# Patient Record
Sex: MALE | Race: WHITE | Employment: OTHER | URBAN - METROPOLITAN AREA
[De-identification: names, ages, dates, MRNs, and addresses within clinical notes are randomized per-mention and may not be internally consistent; named-entity substitution may affect disease eponyms.]

---

## 2021-10-04 ENCOUNTER — OFFICE VISIT (OUTPATIENT)
Dept: INTERNAL MEDICINE CLINIC | Facility: CLINIC | Age: 81
End: 2021-10-04
Payer: MEDICARE

## 2021-10-04 VITALS
OXYGEN SATURATION: 98 % | HEIGHT: 71 IN | BODY MASS INDEX: 23.49 KG/M2 | HEART RATE: 82 BPM | TEMPERATURE: 98 F | SYSTOLIC BLOOD PRESSURE: 138 MMHG | WEIGHT: 167.81 LBS | DIASTOLIC BLOOD PRESSURE: 80 MMHG

## 2021-10-04 DIAGNOSIS — R25.2 LEG CRAMPS: ICD-10-CM

## 2021-10-04 DIAGNOSIS — R26.89 BALANCE DISORDER: Primary | ICD-10-CM

## 2021-10-04 DIAGNOSIS — I73.9 PERIPHERAL VASCULAR DISEASE (HCC): ICD-10-CM

## 2021-10-04 DIAGNOSIS — E78.5 HYPERLIPIDEMIA, UNSPECIFIED HYPERLIPIDEMIA TYPE: ICD-10-CM

## 2021-10-04 DIAGNOSIS — R10.13 DYSPEPSIA: ICD-10-CM

## 2021-10-04 DIAGNOSIS — Z23 NEED FOR VACCINATION: ICD-10-CM

## 2021-10-04 DIAGNOSIS — I71.4 ABDOMINAL AORTIC ANEURYSM (AAA) WITHOUT RUPTURE (HCC): ICD-10-CM

## 2021-10-04 PROBLEM — R33.8 BENIGN PROSTATIC HYPERPLASIA WITH URINARY RETENTION: Status: ACTIVE | Noted: 2021-10-04

## 2021-10-04 PROBLEM — N40.1 BENIGN PROSTATIC HYPERPLASIA WITH URINARY RETENTION: Status: RESOLVED | Noted: 2021-10-04 | Resolved: 2021-10-04

## 2021-10-04 PROBLEM — N40.1 BENIGN PROSTATIC HYPERPLASIA WITH URINARY RETENTION: Status: ACTIVE | Noted: 2021-10-04

## 2021-10-04 PROBLEM — R33.8 BENIGN PROSTATIC HYPERPLASIA WITH URINARY RETENTION: Status: RESOLVED | Noted: 2021-10-04 | Resolved: 2021-10-04

## 2021-10-04 PROCEDURE — 99204 OFFICE O/P NEW MOD 45 MIN: CPT | Performed by: INTERNAL MEDICINE

## 2021-10-04 PROCEDURE — 90732 PPSV23 VACC 2 YRS+ SUBQ/IM: CPT | Performed by: INTERNAL MEDICINE

## 2021-10-04 PROCEDURE — G0009 ADMIN PNEUMOCOCCAL VACCINE: HCPCS | Performed by: INTERNAL MEDICINE

## 2021-10-04 RX ORDER — PANTOPRAZOLE SODIUM 40 MG/1
40 TABLET, DELAYED RELEASE ORAL DAILY
COMMUNITY
Start: 2021-08-30

## 2021-10-04 RX ORDER — CLOPIDOGREL BISULFATE 75 MG/1
75 TABLET ORAL DAILY
COMMUNITY
Start: 2021-08-05

## 2021-10-04 RX ORDER — SIMVASTATIN 40 MG
40 TABLET ORAL NIGHTLY
COMMUNITY
Start: 2021-05-25 | End: 2021-10-04

## 2021-10-04 RX ORDER — SIMVASTATIN 40 MG
40 TABLET ORAL NIGHTLY
Qty: 90 TABLET | Refills: 3 | Status: SHIPPED | OUTPATIENT
Start: 2021-10-04

## 2021-10-04 NOTE — PROGRESS NOTES
Patient was given Pneumococcal vaccine (pneumovax 23). Left deltoid was used for the injection at a 90 degree angle (intramuscular). Patient tolerated vaccine well.

## 2021-10-04 NOTE — PROGRESS NOTES
Cristina Cao  68year old male  Patient presents with:  Establish Care: new to our practice  Other: off balance  . HPI:       This is first visit with patient. He is a 68years old he is retired.   He lives with his wife and daughter who has some • Hernia surgery        History reviewed. No pertinent family history.   Social History    Tobacco Use      Smoking status: Current Every Day Smoker      Smokeless tobacco: Never Used      Tobacco comment: 4-6     Alcohol use: Yes      Comment: 1 of day intensity statin but he thinks he might of had an issue with one in the past.  He currently is on clopidogrel only he is not on dual antiplatelet agents I think that is consistent with the literature.     I would like to review his chart in more detail and

## 2021-11-01 ENCOUNTER — OFFICE VISIT (OUTPATIENT)
Dept: INTERNAL MEDICINE CLINIC | Facility: CLINIC | Age: 81
End: 2021-11-01
Payer: MEDICARE

## 2021-11-01 VITALS
HEART RATE: 63 BPM | SYSTOLIC BLOOD PRESSURE: 122 MMHG | WEIGHT: 169.19 LBS | DIASTOLIC BLOOD PRESSURE: 88 MMHG | TEMPERATURE: 97 F | OXYGEN SATURATION: 100 % | BODY MASS INDEX: 24 KG/M2

## 2021-11-01 DIAGNOSIS — I73.9 PERIPHERAL VASCULAR DISEASE (HCC): ICD-10-CM

## 2021-11-01 DIAGNOSIS — K56.609 SMALL BOWEL OBSTRUCTION (HCC): Primary | ICD-10-CM

## 2021-11-01 DIAGNOSIS — R00.1 BRADYCARDIA: ICD-10-CM

## 2021-11-01 DIAGNOSIS — I71.4 ABDOMINAL AORTIC ANEURYSM (AAA) WITHOUT RUPTURE (HCC): ICD-10-CM

## 2021-11-01 DIAGNOSIS — R26.89 BALANCE DISORDER: ICD-10-CM

## 2021-11-01 PROCEDURE — 99214 OFFICE O/P EST MOD 30 MIN: CPT | Performed by: INTERNAL MEDICINE

## 2021-11-01 RX ORDER — AMOXICILLIN 250 MG
2 CAPSULE ORAL NIGHTLY
COMMUNITY
Start: 2021-10-16 | End: 2021-11-15

## 2021-11-01 NOTE — PROGRESS NOTES
Jermaine Espinoza  68year old male  Patient presents with: Follow - Up: balance disorder  ER F/U: Small bowel obstruction   .     Chief complaint is balance disorder and recent small bowel obstruction      HPI:       Since last visit Rodger Mcintyre was in Jump On It Tab, Take 2 tablets by mouth nightly., Disp: , Rfl:   clopidogrel 75 MG Oral Tab, Take 75 mg by mouth daily. , Disp: , Rfl:   pantoprazole 40 MG Oral Tab EC, Take 40 mg by mouth daily. , Disp: , Rfl:   simvastatin 40 MG Oral Tab, Take 1 tablet (40 mg total) central lower  abdomen with some prominent reactive type lymph nodes.  There is gas and stool  noted in the colon. Bladder: Redemonstrated bladder diverticulum.     Pelvic organs: Prostate gland is normal in size    Abdominal/Pelvic wall: Bilateral fat VASCULAR SURGERY - INTERNAL; Future        We discussed his small bowel obstruction I suspect it was due to adhesions discussed what to do if he has any pain.     His recent bradycardia could bear some light on his balance disorder since it is episodic h

## 2021-12-27 ENCOUNTER — OFFICE VISIT (OUTPATIENT)
Dept: INTERNAL MEDICINE CLINIC | Facility: CLINIC | Age: 81
End: 2021-12-27
Payer: MEDICARE

## 2021-12-27 VITALS
WEIGHT: 175 LBS | HEIGHT: 71 IN | BODY MASS INDEX: 24.5 KG/M2 | SYSTOLIC BLOOD PRESSURE: 158 MMHG | TEMPERATURE: 98 F | DIASTOLIC BLOOD PRESSURE: 98 MMHG | OXYGEN SATURATION: 99 % | HEART RATE: 68 BPM

## 2021-12-27 DIAGNOSIS — R00.1 BRADYCARDIA: ICD-10-CM

## 2021-12-27 DIAGNOSIS — I71.4 ABDOMINAL AORTIC ANEURYSM (AAA) WITHOUT RUPTURE (HCC): ICD-10-CM

## 2021-12-27 DIAGNOSIS — I10 PRIMARY HYPERTENSION: Primary | ICD-10-CM

## 2021-12-27 DIAGNOSIS — Z12.5 PROSTATE CANCER SCREENING: ICD-10-CM

## 2021-12-27 DIAGNOSIS — R25.2 LEG CRAMPS: ICD-10-CM

## 2021-12-27 PROCEDURE — 85025 COMPLETE CBC W/AUTO DIFF WBC: CPT | Performed by: INTERNAL MEDICINE

## 2021-12-27 PROCEDURE — 99214 OFFICE O/P EST MOD 30 MIN: CPT | Performed by: INTERNAL MEDICINE

## 2021-12-27 PROCEDURE — 80048 BASIC METABOLIC PNL TOTAL CA: CPT | Performed by: INTERNAL MEDICINE

## 2021-12-27 NOTE — PROGRESS NOTES
Lauryn Latrice  80year old male  Patient presents with: Follow - Up: balance disorder and recent small bowel obstruction  . Hypertension      HPI:       33-year-old gentleman comes in with elevated blood pressure.   Since last visit he has seen cardiolog comment: 4-6     Alcohol use: Yes      Comment: 1 of day    Drug use: Never     No Known Allergies   clopidogrel 75 MG Oral Tab, Take 75 mg by mouth daily. , Disp: , Rfl:   pantoprazole 40 MG Oral Tab EC, Take 40 mg by mouth daily. , Disp: , Rfl:   avila identified, these errors have been corrected.  While every attempt is made to correct errors during dictation, discrepancies may still exist       Milka French MD

## 2021-12-28 NOTE — PROGRESS NOTES
Please let know that labs look stable. His creatinine was 1.35 when he was in [facility] [place] [facility] it was as high as 1.41 so no significant change.

## 2021-12-30 ENCOUNTER — OFFICE VISIT (OUTPATIENT)
Dept: INTERNAL MEDICINE CLINIC | Facility: CLINIC | Age: 81
End: 2021-12-30
Payer: MEDICARE

## 2021-12-30 VITALS
OXYGEN SATURATION: 100 % | HEART RATE: 84 BPM | BODY MASS INDEX: 24.5 KG/M2 | WEIGHT: 175 LBS | HEIGHT: 71 IN | DIASTOLIC BLOOD PRESSURE: 88 MMHG | TEMPERATURE: 98 F | SYSTOLIC BLOOD PRESSURE: 148 MMHG

## 2021-12-30 DIAGNOSIS — I71.4 ABDOMINAL AORTIC ANEURYSM (AAA) WITHOUT RUPTURE (HCC): ICD-10-CM

## 2021-12-30 DIAGNOSIS — I10 PRIMARY HYPERTENSION: Primary | ICD-10-CM

## 2021-12-30 DIAGNOSIS — I51.89 DIASTOLIC DYSFUNCTION WITHOUT HEART FAILURE: ICD-10-CM

## 2021-12-30 DIAGNOSIS — N18.30 STAGE 3 CHRONIC KIDNEY DISEASE, UNSPECIFIED WHETHER STAGE 3A OR 3B CKD (HCC): ICD-10-CM

## 2021-12-30 PROCEDURE — 99213 OFFICE O/P EST LOW 20 MIN: CPT | Performed by: INTERNAL MEDICINE

## 2021-12-30 RX ORDER — LOSARTAN POTASSIUM 50 MG/1
50 TABLET ORAL DAILY
Qty: 90 TABLET | Refills: 1 | Status: SHIPPED | OUTPATIENT
Start: 2021-12-30

## 2021-12-30 NOTE — PROGRESS NOTES
Lukas Roberson  80year old male  Patient presents with:  Blood Pressure: follow-up  Lab Results  . HPI:       Patient has elevated blood pressure last visit he was 155/95.   He has a history of falling so were concerned about adding blood pressure m rupture (HCC)    Stage 3 chronic kidney disease, unspecified whether stage 3a or 3b CKD (HCC)    Diastolic dysfunction without heart failure    Other orders  -     losartan 50 MG Oral Tab; Take 1 tablet (50 mg total) by mouth daily.         Discussed his el

## 2022-01-20 ENCOUNTER — OFFICE VISIT (OUTPATIENT)
Dept: INTERNAL MEDICINE CLINIC | Facility: CLINIC | Age: 82
End: 2022-01-20
Payer: MEDICARE

## 2022-01-20 VITALS
WEIGHT: 174.81 LBS | DIASTOLIC BLOOD PRESSURE: 80 MMHG | TEMPERATURE: 98 F | HEIGHT: 71 IN | SYSTOLIC BLOOD PRESSURE: 120 MMHG | BODY MASS INDEX: 24.47 KG/M2 | HEART RATE: 80 BPM | OXYGEN SATURATION: 99 %

## 2022-01-20 DIAGNOSIS — I71.4 ABDOMINAL AORTIC ANEURYSM (AAA) WITHOUT RUPTURE (HCC): ICD-10-CM

## 2022-01-20 DIAGNOSIS — Z87.891 PERSONAL HISTORY OF NICOTINE DEPENDENCE: ICD-10-CM

## 2022-01-20 DIAGNOSIS — I51.89 DIASTOLIC DYSFUNCTION WITHOUT HEART FAILURE: ICD-10-CM

## 2022-01-20 DIAGNOSIS — F17.200 TOBACCO USE DISORDER: ICD-10-CM

## 2022-01-20 DIAGNOSIS — I10 PRIMARY HYPERTENSION: Primary | ICD-10-CM

## 2022-01-20 DIAGNOSIS — Z98.890 HISTORY OF ABDOMINAL AORTIC ANEURYSM (AAA) REPAIR: ICD-10-CM

## 2022-01-20 PROCEDURE — 99213 OFFICE O/P EST LOW 20 MIN: CPT | Performed by: INTERNAL MEDICINE

## 2022-01-20 PROCEDURE — 99406 BEHAV CHNG SMOKING 3-10 MIN: CPT | Performed by: INTERNAL MEDICINE

## 2022-01-20 NOTE — PROGRESS NOTES
Joel Bennett  80year old male   Patient presents with:  Hypertension    .           HPI:       Is here with   Primary hypertension  (primary encounter diagnosis)  Diastolic dysfunction without heart failure  Abdominal aortic aneurysm (AAA) without rupture History:   Procedure Laterality Date   • Cyst removal     • Hernia surgery        History reviewed. No pertinent family history.   Social History    Tobacco Use      Smoking status: Current Every Day Smoker      Smokeless tobacco: Never Used      Tobacco co 12/27/2021    HCT 43.8 12/27/2021    MCV 98.2 12/27/2021    MCH 30.9 12/27/2021    MCHC 31.5 12/27/2021    RDW 13.2 12/27/2021    .0 12/27/2021        No results found for: CHOLEST, TRIG, HDL, LDL, VLDL, TCHDLRATIO, NONHDLC, CHOLHDLRATIO, NONHDLC, C benefits. Specifically, he was told that Quitting tobacco is one of the best things he can do for his health. I strongly encouraged him to quit. Agree:  We collaboratively selected appropriate treatment goals and methods based on the patient’s interest

## 2022-05-16 ENCOUNTER — OFFICE VISIT (OUTPATIENT)
Dept: INTERNAL MEDICINE CLINIC | Facility: CLINIC | Age: 82
End: 2022-05-16
Payer: MEDICARE

## 2022-05-16 VITALS
OXYGEN SATURATION: 100 % | TEMPERATURE: 97 F | BODY MASS INDEX: 23.8 KG/M2 | WEIGHT: 170 LBS | SYSTOLIC BLOOD PRESSURE: 135 MMHG | HEIGHT: 71 IN | DIASTOLIC BLOOD PRESSURE: 76 MMHG | HEART RATE: 77 BPM

## 2022-05-16 DIAGNOSIS — I10 PRIMARY HYPERTENSION: Primary | ICD-10-CM

## 2022-05-16 DIAGNOSIS — E78.5 HYPERLIPIDEMIA, UNSPECIFIED HYPERLIPIDEMIA TYPE: ICD-10-CM

## 2022-05-16 DIAGNOSIS — Z98.890 HISTORY OF ABDOMINAL AORTIC ANEURYSM (AAA) REPAIR: ICD-10-CM

## 2022-05-16 DIAGNOSIS — R00.1 BRADYCARDIA: ICD-10-CM

## 2022-05-16 DIAGNOSIS — N18.30 STAGE 3 CHRONIC KIDNEY DISEASE, UNSPECIFIED WHETHER STAGE 3A OR 3B CKD (HCC): ICD-10-CM

## 2022-05-16 DIAGNOSIS — F17.200 TOBACCO USE DISORDER: ICD-10-CM

## 2022-05-16 DIAGNOSIS — R42 DYSEQUILIBRIUM: ICD-10-CM

## 2022-05-16 DIAGNOSIS — I71.4 ABDOMINAL AORTIC ANEURYSM (AAA) WITHOUT RUPTURE (HCC): ICD-10-CM

## 2022-05-16 LAB
ALBUMIN SERPL-MCNC: 3.5 G/DL (ref 3.4–5)
ALBUMIN/GLOB SERPL: 1.1 {RATIO} (ref 1–2)
ALP LIVER SERPL-CCNC: 76 U/L
ALT SERPL-CCNC: 21 U/L
ANION GAP SERPL CALC-SCNC: 7 MMOL/L (ref 0–18)
AST SERPL-CCNC: 21 U/L (ref 15–37)
BASOPHILS # BLD AUTO: 0.04 X10(3) UL (ref 0–0.2)
BASOPHILS NFR BLD AUTO: 0.5 %
BILIRUB SERPL-MCNC: 0.3 MG/DL (ref 0.1–2)
BUN BLD-MCNC: 25 MG/DL (ref 7–18)
BUN/CREAT SERPL: 16.4 (ref 10–20)
CALCIUM BLD-MCNC: 8.8 MG/DL (ref 8.5–10.1)
CHLORIDE SERPL-SCNC: 110 MMOL/L (ref 98–112)
CHOLEST SERPL-MCNC: 133 MG/DL (ref ?–200)
CO2 SERPL-SCNC: 25 MMOL/L (ref 21–32)
CREAT BLD-MCNC: 1.52 MG/DL
DEPRECATED RDW RBC AUTO: 49.7 FL (ref 35.1–46.3)
EOSINOPHIL # BLD AUTO: 0.14 X10(3) UL (ref 0–0.7)
EOSINOPHIL NFR BLD AUTO: 1.8 %
ERYTHROCYTE [DISTWIDTH] IN BLOOD BY AUTOMATED COUNT: 13.6 % (ref 11–15)
FASTING PATIENT LIPID ANSWER: NO
FASTING STATUS PATIENT QL REPORTED: NO
GLOBULIN PLAS-MCNC: 3.2 G/DL (ref 2.8–4.4)
GLUCOSE BLD-MCNC: 90 MG/DL (ref 70–99)
HCT VFR BLD AUTO: 39.9 %
HDLC SERPL-MCNC: 55 MG/DL (ref 40–59)
HGB BLD-MCNC: 12.4 G/DL
IMM GRANULOCYTES # BLD AUTO: 0.02 X10(3) UL (ref 0–1)
IMM GRANULOCYTES NFR BLD: 0.3 %
LDLC SERPL CALC-MCNC: 61 MG/DL (ref ?–100)
LYMPHOCYTES # BLD AUTO: 1.59 X10(3) UL (ref 1–4)
LYMPHOCYTES NFR BLD AUTO: 20.1 %
MCH RBC QN AUTO: 30.5 PG (ref 26–34)
MCHC RBC AUTO-ENTMCNC: 31.1 G/DL (ref 31–37)
MCV RBC AUTO: 98 FL
MONOCYTES # BLD AUTO: 0.85 X10(3) UL (ref 0.1–1)
MONOCYTES NFR BLD AUTO: 10.8 %
NEUTROPHILS # BLD AUTO: 5.26 X10 (3) UL (ref 1.5–7.7)
NEUTROPHILS # BLD AUTO: 5.26 X10(3) UL (ref 1.5–7.7)
NEUTROPHILS NFR BLD AUTO: 66.5 %
NONHDLC SERPL-MCNC: 78 MG/DL (ref ?–130)
OSMOLALITY SERPL CALC.SUM OF ELEC: 298 MOSM/KG (ref 275–295)
PLATELET # BLD AUTO: 178 10(3)UL (ref 150–450)
POTASSIUM SERPL-SCNC: 4.4 MMOL/L (ref 3.5–5.1)
PROT SERPL-MCNC: 6.7 G/DL (ref 6.4–8.2)
RBC # BLD AUTO: 4.07 X10(6)UL
SODIUM SERPL-SCNC: 142 MMOL/L (ref 136–145)
TRIGL SERPL-MCNC: 88 MG/DL (ref 30–149)
VLDLC SERPL CALC-MCNC: 13 MG/DL (ref 0–30)
WBC # BLD AUTO: 7.9 X10(3) UL (ref 4–11)

## 2022-05-16 PROCEDURE — 80061 LIPID PANEL: CPT | Performed by: INTERNAL MEDICINE

## 2022-05-16 PROCEDURE — 85025 COMPLETE CBC W/AUTO DIFF WBC: CPT | Performed by: INTERNAL MEDICINE

## 2022-05-16 PROCEDURE — 80053 COMPREHEN METABOLIC PANEL: CPT | Performed by: INTERNAL MEDICINE

## 2022-05-16 PROCEDURE — 36415 COLL VENOUS BLD VENIPUNCTURE: CPT | Performed by: INTERNAL MEDICINE

## 2022-05-16 PROCEDURE — 99214 OFFICE O/P EST MOD 30 MIN: CPT | Performed by: INTERNAL MEDICINE

## 2022-05-17 ENCOUNTER — TELEPHONE (OUTPATIENT)
Dept: INTERNAL MEDICINE CLINIC | Facility: CLINIC | Age: 82
End: 2022-05-17

## 2022-05-17 NOTE — TELEPHONE ENCOUNTER
Called patient in regards to lab results.  was verified and results were given. Patient made an appointment for .

## 2022-05-17 NOTE — TELEPHONE ENCOUNTER
----- Message from Xander Martinez MD sent at 5/17/2022  8:24 AM CDT -----  Please let Sammy Monreal know that his kidney test is slightly worse than last time I would like to see him in June instead of August.  We will talk about this in more detail then

## 2022-06-06 ENCOUNTER — OFFICE VISIT (OUTPATIENT)
Dept: INTERNAL MEDICINE CLINIC | Facility: CLINIC | Age: 82
End: 2022-06-06
Payer: MEDICARE

## 2022-06-06 VITALS
HEART RATE: 96 BPM | HEIGHT: 71 IN | SYSTOLIC BLOOD PRESSURE: 130 MMHG | TEMPERATURE: 98 F | BODY MASS INDEX: 23.8 KG/M2 | WEIGHT: 170 LBS | OXYGEN SATURATION: 100 % | DIASTOLIC BLOOD PRESSURE: 78 MMHG

## 2022-06-06 DIAGNOSIS — I49.5 BRADY-TACHY SYNDROME (HCC): Primary | ICD-10-CM

## 2022-06-06 DIAGNOSIS — I71.4 ABDOMINAL AORTIC ANEURYSM (AAA) WITHOUT RUPTURE (HCC): ICD-10-CM

## 2022-06-06 DIAGNOSIS — I10 PRIMARY HYPERTENSION: ICD-10-CM

## 2022-06-06 DIAGNOSIS — N18.30 STAGE 3 CHRONIC KIDNEY DISEASE, UNSPECIFIED WHETHER STAGE 3A OR 3B CKD (HCC): ICD-10-CM

## 2022-06-06 DIAGNOSIS — E78.5 HYPERLIPIDEMIA, UNSPECIFIED HYPERLIPIDEMIA TYPE: ICD-10-CM

## 2022-06-06 DIAGNOSIS — I95.9 HYPOTENSION, UNSPECIFIED HYPOTENSION TYPE: ICD-10-CM

## 2022-06-06 PROCEDURE — 99214 OFFICE O/P EST MOD 30 MIN: CPT | Performed by: INTERNAL MEDICINE

## 2022-06-09 RX ORDER — LOSARTAN POTASSIUM 50 MG/1
50 TABLET ORAL DAILY
Qty: 90 TABLET | Refills: 0 | Status: SHIPPED | OUTPATIENT
Start: 2022-06-09 | End: 2022-07-21

## 2022-06-17 ENCOUNTER — LAB ENCOUNTER (OUTPATIENT)
Dept: LAB | Facility: REFERENCE LAB | Age: 82
End: 2022-06-17
Attending: INTERNAL MEDICINE
Payer: MEDICARE

## 2022-06-17 LAB
ANION GAP SERPL CALC-SCNC: 7 MMOL/L (ref 0–18)
BUN BLD-MCNC: 31 MG/DL (ref 7–18)
BUN/CREAT SERPL: 24 (ref 10–20)
CALCIUM BLD-MCNC: 9.1 MG/DL (ref 8.5–10.1)
CHLORIDE SERPL-SCNC: 110 MMOL/L (ref 98–112)
CO2 SERPL-SCNC: 25 MMOL/L (ref 21–32)
CREAT BLD-MCNC: 1.29 MG/DL
FASTING STATUS PATIENT QL REPORTED: NO
GLUCOSE BLD-MCNC: 90 MG/DL (ref 70–99)
OSMOLALITY SERPL CALC.SUM OF ELEC: 300 MOSM/KG (ref 275–295)
POTASSIUM SERPL-SCNC: 4.4 MMOL/L (ref 3.5–5.1)
SODIUM SERPL-SCNC: 142 MMOL/L (ref 136–145)

## 2022-06-17 PROCEDURE — 80048 BASIC METABOLIC PNL TOTAL CA: CPT | Performed by: INTERNAL MEDICINE

## 2022-06-17 PROCEDURE — 36415 COLL VENOUS BLD VENIPUNCTURE: CPT | Performed by: INTERNAL MEDICINE

## 2022-07-05 ENCOUNTER — OFFICE VISIT (OUTPATIENT)
Dept: INTERNAL MEDICINE CLINIC | Facility: CLINIC | Age: 82
End: 2022-07-05
Payer: MEDICARE

## 2022-07-05 VITALS
SYSTOLIC BLOOD PRESSURE: 130 MMHG | BODY MASS INDEX: 24 KG/M2 | TEMPERATURE: 97 F | WEIGHT: 170 LBS | DIASTOLIC BLOOD PRESSURE: 80 MMHG | HEART RATE: 76 BPM | OXYGEN SATURATION: 99 %

## 2022-07-05 DIAGNOSIS — F17.200 TOBACCO USE DISORDER: ICD-10-CM

## 2022-07-05 DIAGNOSIS — I71.4 ABDOMINAL AORTIC ANEURYSM (AAA) WITHOUT RUPTURE (HCC): Primary | ICD-10-CM

## 2022-07-05 DIAGNOSIS — I10 PRIMARY HYPERTENSION: ICD-10-CM

## 2022-07-05 DIAGNOSIS — I73.9 PERIPHERAL VASCULAR DISEASE (HCC): ICD-10-CM

## 2022-07-05 DIAGNOSIS — N18.30 STAGE 3 CHRONIC KIDNEY DISEASE, UNSPECIFIED WHETHER STAGE 3A OR 3B CKD (HCC): ICD-10-CM

## 2022-07-05 PROCEDURE — 99214 OFFICE O/P EST MOD 30 MIN: CPT | Performed by: INTERNAL MEDICINE

## 2022-07-21 RX ORDER — LOSARTAN POTASSIUM 50 MG/1
TABLET ORAL
Qty: 90 TABLET | Refills: 0 | Status: SHIPPED | OUTPATIENT
Start: 2022-07-21

## 2022-09-06 RX ORDER — SIMVASTATIN 40 MG
40 TABLET ORAL NIGHTLY
Qty: 90 TABLET | Refills: 0 | Status: SHIPPED | OUTPATIENT
Start: 2022-09-06 | End: 2022-12-08

## 2022-10-05 ENCOUNTER — OFFICE VISIT (OUTPATIENT)
Dept: INTERNAL MEDICINE CLINIC | Facility: CLINIC | Age: 82
End: 2022-10-05
Payer: MEDICARE

## 2022-10-05 VITALS
BODY MASS INDEX: 25 KG/M2 | SYSTOLIC BLOOD PRESSURE: 112 MMHG | DIASTOLIC BLOOD PRESSURE: 70 MMHG | OXYGEN SATURATION: 100 % | HEART RATE: 62 BPM | WEIGHT: 178 LBS | TEMPERATURE: 98 F

## 2022-10-05 DIAGNOSIS — I73.9 PERIPHERAL VASCULAR DISEASE (HCC): ICD-10-CM

## 2022-10-05 DIAGNOSIS — E78.5 HYPERLIPIDEMIA, UNSPECIFIED HYPERLIPIDEMIA TYPE: ICD-10-CM

## 2022-10-05 DIAGNOSIS — I47.1 PSVT (PAROXYSMAL SUPRAVENTRICULAR TACHYCARDIA) (HCC): ICD-10-CM

## 2022-10-05 DIAGNOSIS — I71.40 ABDOMINAL AORTIC ANEURYSM (AAA) WITHOUT RUPTURE, UNSPECIFIED PART: Primary | ICD-10-CM

## 2022-10-05 DIAGNOSIS — R25.2 LEG CRAMPS: ICD-10-CM

## 2022-10-05 DIAGNOSIS — N18.30 STAGE 3 CHRONIC KIDNEY DISEASE, UNSPECIFIED WHETHER STAGE 3A OR 3B CKD (HCC): ICD-10-CM

## 2022-10-05 PROBLEM — K56.609 SMALL BOWEL OBSTRUCTION (HCC): Status: RESOLVED | Noted: 2021-10-13 | Resolved: 2022-10-05

## 2022-10-05 PROBLEM — R10.13 DYSPEPSIA: Status: RESOLVED | Noted: 2021-10-04 | Resolved: 2022-10-05

## 2022-10-05 PROCEDURE — 99214 OFFICE O/P EST MOD 30 MIN: CPT | Performed by: INTERNAL MEDICINE

## 2022-10-05 RX ORDER — METOPROLOL SUCCINATE 25 MG/1
25 TABLET, EXTENDED RELEASE ORAL DAILY
COMMUNITY
Start: 2022-09-06

## 2022-12-01 RX ORDER — LOSARTAN POTASSIUM 50 MG/1
TABLET ORAL
Qty: 90 TABLET | Refills: 0 | Status: SHIPPED | OUTPATIENT
Start: 2022-12-01

## 2022-12-08 RX ORDER — SIMVASTATIN 40 MG
TABLET ORAL
Qty: 90 TABLET | Refills: 0 | Status: SHIPPED | OUTPATIENT
Start: 2022-12-08

## 2022-12-30 RX ORDER — LOSARTAN POTASSIUM 50 MG/1
TABLET ORAL
Qty: 90 TABLET | Refills: 0 | Status: SHIPPED | OUTPATIENT
Start: 2022-12-30

## 2023-01-30 ENCOUNTER — LAB ENCOUNTER (OUTPATIENT)
Dept: LAB | Facility: HOSPITAL | Age: 83
End: 2023-01-30
Attending: INTERNAL MEDICINE
Payer: MEDICARE

## 2023-01-30 LAB
ALBUMIN SERPL-MCNC: 3.3 G/DL (ref 3.4–5)
ALBUMIN/GLOB SERPL: 1 {RATIO} (ref 1–2)
ALP LIVER SERPL-CCNC: 94 U/L
ALT SERPL-CCNC: 15 U/L
ANION GAP SERPL CALC-SCNC: 2 MMOL/L (ref 0–18)
AST SERPL-CCNC: 17 U/L (ref 15–37)
BASOPHILS # BLD AUTO: 0.03 X10(3) UL (ref 0–0.2)
BASOPHILS NFR BLD AUTO: 0.3 %
BILIRUB SERPL-MCNC: 0.3 MG/DL (ref 0.1–2)
BUN BLD-MCNC: 23 MG/DL (ref 7–18)
BUN/CREAT SERPL: 16.8 (ref 10–20)
CALCIUM BLD-MCNC: 8.8 MG/DL (ref 8.5–10.1)
CHLORIDE SERPL-SCNC: 112 MMOL/L (ref 98–112)
CHOLEST SERPL-MCNC: 135 MG/DL (ref ?–200)
CO2 SERPL-SCNC: 28 MMOL/L (ref 21–32)
CREAT BLD-MCNC: 1.37 MG/DL
DEPRECATED RDW RBC AUTO: 47.5 FL (ref 35.1–46.3)
EOSINOPHIL # BLD AUTO: 0.18 X10(3) UL (ref 0–0.7)
EOSINOPHIL NFR BLD AUTO: 2.1 %
ERYTHROCYTE [DISTWIDTH] IN BLOOD BY AUTOMATED COUNT: 13.2 % (ref 11–15)
FASTING PATIENT LIPID ANSWER: NO
FASTING STATUS PATIENT QL REPORTED: NO
GFR SERPLBLD BASED ON 1.73 SQ M-ARVRAT: 52 ML/MIN/1.73M2 (ref 60–?)
GLOBULIN PLAS-MCNC: 3.2 G/DL (ref 2.8–4.4)
GLUCOSE BLD-MCNC: 96 MG/DL (ref 70–99)
HCT VFR BLD AUTO: 39.4 %
HDLC SERPL-MCNC: 54 MG/DL (ref 40–59)
HGB BLD-MCNC: 12.6 G/DL
IMM GRANULOCYTES # BLD AUTO: 0.04 X10(3) UL (ref 0–1)
IMM GRANULOCYTES NFR BLD: 0.5 %
LDLC SERPL CALC-MCNC: 60 MG/DL (ref ?–100)
LYMPHOCYTES # BLD AUTO: 1.91 X10(3) UL (ref 1–4)
LYMPHOCYTES NFR BLD AUTO: 22.2 %
MCH RBC QN AUTO: 31.2 PG (ref 26–34)
MCHC RBC AUTO-ENTMCNC: 32 G/DL (ref 31–37)
MCV RBC AUTO: 97.5 FL
MONOCYTES # BLD AUTO: 0.72 X10(3) UL (ref 0.1–1)
MONOCYTES NFR BLD AUTO: 8.4 %
NEUTROPHILS # BLD AUTO: 5.73 X10 (3) UL (ref 1.5–7.7)
NEUTROPHILS # BLD AUTO: 5.73 X10(3) UL (ref 1.5–7.7)
NEUTROPHILS NFR BLD AUTO: 66.5 %
NONHDLC SERPL-MCNC: 81 MG/DL (ref ?–130)
OSMOLALITY SERPL CALC.SUM OF ELEC: 298 MOSM/KG (ref 275–295)
PLATELET # BLD AUTO: 164 10(3)UL (ref 150–450)
POTASSIUM SERPL-SCNC: 4.3 MMOL/L (ref 3.5–5.1)
PROT SERPL-MCNC: 6.5 G/DL (ref 6.4–8.2)
RBC # BLD AUTO: 4.04 X10(6)UL
SODIUM SERPL-SCNC: 142 MMOL/L (ref 136–145)
TRIGL SERPL-MCNC: 121 MG/DL (ref 30–149)
TSI SER-ACNC: 0.93 MIU/ML (ref 0.36–3.74)
VLDLC SERPL CALC-MCNC: 18 MG/DL (ref 0–30)
WBC # BLD AUTO: 8.6 X10(3) UL (ref 4–11)

## 2023-01-30 PROCEDURE — 36415 COLL VENOUS BLD VENIPUNCTURE: CPT | Performed by: INTERNAL MEDICINE

## 2023-01-30 PROCEDURE — 80053 COMPREHEN METABOLIC PANEL: CPT | Performed by: INTERNAL MEDICINE

## 2023-01-30 PROCEDURE — 85025 COMPLETE CBC W/AUTO DIFF WBC: CPT | Performed by: INTERNAL MEDICINE

## 2023-01-30 PROCEDURE — 80061 LIPID PANEL: CPT | Performed by: INTERNAL MEDICINE

## 2023-01-30 PROCEDURE — 84443 ASSAY THYROID STIM HORMONE: CPT | Performed by: INTERNAL MEDICINE

## 2023-01-31 ENCOUNTER — TELEPHONE (OUTPATIENT)
Dept: INTERNAL MEDICINE CLINIC | Facility: CLINIC | Age: 83
End: 2023-01-31

## 2023-01-31 NOTE — TELEPHONE ENCOUNTER
----- Message from Carlos Thapa MD sent at 1/30/2023  5:15 PM CST -----  Please let know that labs are about the same.   I will go over with them next visit in a week

## 2023-02-05 PROBLEM — I34.0 NONRHEUMATIC MITRAL VALVE REGURGITATION: Status: ACTIVE | Noted: 2023-02-05

## 2023-02-09 RX ORDER — SIMVASTATIN 40 MG
TABLET ORAL
Qty: 90 TABLET | Refills: 0 | Status: SHIPPED | OUTPATIENT
Start: 2023-02-09

## 2023-04-05 ENCOUNTER — LAB ENCOUNTER (OUTPATIENT)
Dept: LAB | Facility: HOSPITAL | Age: 83
End: 2023-04-05
Attending: INTERNAL MEDICINE
Payer: MEDICARE

## 2023-04-05 DIAGNOSIS — E78.2 MIXED HYPERLIPIDEMIA: Primary | ICD-10-CM

## 2023-04-05 DIAGNOSIS — I47.1 PAROXYSMAL SUPRAVENTRICULAR TACHYCARDIA (HCC): ICD-10-CM

## 2023-04-05 DIAGNOSIS — I34.0 NONRHEUMATIC MITRAL VALVE INSUFFICIENCY: ICD-10-CM

## 2023-04-05 DIAGNOSIS — R55 SYNCOPE AND COLLAPSE: ICD-10-CM

## 2023-04-05 LAB
ALBUMIN SERPL-MCNC: 3.5 G/DL (ref 3.4–5)
ALBUMIN/GLOB SERPL: 1 {RATIO} (ref 1–2)
ALP LIVER SERPL-CCNC: 89 U/L
ALT SERPL-CCNC: 17 U/L
ANION GAP SERPL CALC-SCNC: 5 MMOL/L (ref 0–18)
AST SERPL-CCNC: 13 U/L (ref 15–37)
BASOPHILS # BLD AUTO: 0.03 X10(3) UL (ref 0–0.2)
BASOPHILS NFR BLD AUTO: 0.3 %
BILIRUB SERPL-MCNC: 0.4 MG/DL (ref 0.1–2)
BUN BLD-MCNC: 20 MG/DL (ref 7–18)
BUN/CREAT SERPL: 15.9 (ref 10–20)
CALCIUM BLD-MCNC: 9 MG/DL (ref 8.5–10.1)
CHLORIDE SERPL-SCNC: 108 MMOL/L (ref 98–112)
CHOLEST SERPL-MCNC: 134 MG/DL (ref ?–200)
CO2 SERPL-SCNC: 27 MMOL/L (ref 21–32)
CREAT BLD-MCNC: 1.26 MG/DL
DEPRECATED RDW RBC AUTO: 46.1 FL (ref 35.1–46.3)
EOSINOPHIL # BLD AUTO: 0.13 X10(3) UL (ref 0–0.7)
EOSINOPHIL NFR BLD AUTO: 1.4 %
ERYTHROCYTE [DISTWIDTH] IN BLOOD BY AUTOMATED COUNT: 13.2 % (ref 11–15)
FASTING PATIENT LIPID ANSWER: NO
FASTING STATUS PATIENT QL REPORTED: NO
GFR SERPLBLD BASED ON 1.73 SQ M-ARVRAT: 58 ML/MIN/1.73M2 (ref 60–?)
GLOBULIN PLAS-MCNC: 3.5 G/DL (ref 2.8–4.4)
GLUCOSE BLD-MCNC: 105 MG/DL (ref 70–99)
HCT VFR BLD AUTO: 40.3 %
HDLC SERPL-MCNC: 56 MG/DL (ref 40–59)
HGB BLD-MCNC: 13 G/DL
IMM GRANULOCYTES # BLD AUTO: 0.05 X10(3) UL (ref 0–1)
IMM GRANULOCYTES NFR BLD: 0.5 %
LDLC SERPL CALC-MCNC: 61 MG/DL (ref ?–100)
LYMPHOCYTES # BLD AUTO: 1.73 X10(3) UL (ref 1–4)
LYMPHOCYTES NFR BLD AUTO: 18.3 %
MCH RBC QN AUTO: 30.6 PG (ref 26–34)
MCHC RBC AUTO-ENTMCNC: 32.3 G/DL (ref 31–37)
MCV RBC AUTO: 94.8 FL
MONOCYTES # BLD AUTO: 0.76 X10(3) UL (ref 0.1–1)
MONOCYTES NFR BLD AUTO: 8.1 %
NEUTROPHILS # BLD AUTO: 6.74 X10 (3) UL (ref 1.5–7.7)
NEUTROPHILS # BLD AUTO: 6.74 X10(3) UL (ref 1.5–7.7)
NEUTROPHILS NFR BLD AUTO: 71.4 %
NONHDLC SERPL-MCNC: 78 MG/DL (ref ?–130)
OSMOLALITY SERPL CALC.SUM OF ELEC: 293 MOSM/KG (ref 275–295)
PLATELET # BLD AUTO: 179 10(3)UL (ref 150–450)
POTASSIUM SERPL-SCNC: 3.8 MMOL/L (ref 3.5–5.1)
PROT SERPL-MCNC: 7 G/DL (ref 6.4–8.2)
RBC # BLD AUTO: 4.25 X10(6)UL
SODIUM SERPL-SCNC: 140 MMOL/L (ref 136–145)
TRIGL SERPL-MCNC: 89 MG/DL (ref 30–149)
VLDLC SERPL CALC-MCNC: 13 MG/DL (ref 0–30)
WBC # BLD AUTO: 9.4 X10(3) UL (ref 4–11)

## 2023-04-05 PROCEDURE — 80061 LIPID PANEL: CPT

## 2023-04-05 PROCEDURE — 85025 COMPLETE CBC W/AUTO DIFF WBC: CPT

## 2023-04-05 PROCEDURE — 80053 COMPREHEN METABOLIC PANEL: CPT

## 2023-04-05 PROCEDURE — 36415 COLL VENOUS BLD VENIPUNCTURE: CPT

## 2023-04-26 RX ORDER — SIMVASTATIN 40 MG
TABLET ORAL
Qty: 90 TABLET | Refills: 0 | Status: SHIPPED | OUTPATIENT
Start: 2023-04-26

## 2023-04-26 RX ORDER — LOSARTAN POTASSIUM 50 MG/1
TABLET ORAL
Qty: 90 TABLET | Refills: 0 | Status: SHIPPED | OUTPATIENT
Start: 2023-04-26

## 2023-06-06 ENCOUNTER — OFFICE VISIT (OUTPATIENT)
Dept: INTERNAL MEDICINE CLINIC | Facility: CLINIC | Age: 83
End: 2023-06-06
Payer: MEDICARE

## 2023-06-06 VITALS
BODY MASS INDEX: 27 KG/M2 | WEIGHT: 190 LBS | SYSTOLIC BLOOD PRESSURE: 122 MMHG | HEART RATE: 71 BPM | DIASTOLIC BLOOD PRESSURE: 80 MMHG | OXYGEN SATURATION: 98 % | TEMPERATURE: 98 F

## 2023-06-06 DIAGNOSIS — I10 PRIMARY HYPERTENSION: Primary | ICD-10-CM

## 2023-06-06 DIAGNOSIS — I71.43 INFRARENAL ABDOMINAL AORTIC ANEURYSM (AAA) WITHOUT RUPTURE (HCC): ICD-10-CM

## 2023-06-06 DIAGNOSIS — R25.2 LEG CRAMPS: ICD-10-CM

## 2023-06-06 DIAGNOSIS — F17.210 CIGARETTE SMOKER: ICD-10-CM

## 2023-06-06 DIAGNOSIS — N18.31 STAGE 3A CHRONIC KIDNEY DISEASE (HCC): ICD-10-CM

## 2023-06-06 DIAGNOSIS — R42 DYSEQUILIBRIUM: ICD-10-CM

## 2023-06-06 DIAGNOSIS — I34.0 NONRHEUMATIC MITRAL VALVE REGURGITATION: ICD-10-CM

## 2023-06-06 DIAGNOSIS — I73.9 PERIPHERAL VASCULAR DISEASE (HCC): ICD-10-CM

## 2023-06-06 DIAGNOSIS — F17.200 TOBACCO USE DISORDER: ICD-10-CM

## 2023-06-06 PROCEDURE — 99214 OFFICE O/P EST MOD 30 MIN: CPT | Performed by: INTERNAL MEDICINE

## 2023-06-30 RX ORDER — LOSARTAN POTASSIUM 50 MG/1
50 TABLET ORAL DAILY
Qty: 90 TABLET | Refills: 0 | Status: SHIPPED | OUTPATIENT
Start: 2023-06-30

## 2023-07-28 RX ORDER — SIMVASTATIN 40 MG
40 TABLET ORAL NIGHTLY
Qty: 90 TABLET | Refills: 0 | Status: SHIPPED | OUTPATIENT
Start: 2023-07-28

## 2023-10-06 PROBLEM — I49.5 BRADY-TACHY SYNDROME (HCC): Status: ACTIVE | Noted: 2023-10-06

## 2023-10-30 RX ORDER — SIMVASTATIN 40 MG
40 TABLET ORAL NIGHTLY
Qty: 90 TABLET | Refills: 0 | Status: SHIPPED | OUTPATIENT
Start: 2023-10-30

## 2023-10-31 RX ORDER — LOSARTAN POTASSIUM 50 MG/1
50 TABLET ORAL DAILY
Qty: 90 TABLET | Refills: 0 | Status: SHIPPED | OUTPATIENT
Start: 2023-10-31

## 2023-11-03 RX ORDER — LOSARTAN POTASSIUM 50 MG/1
50 TABLET ORAL DAILY
Qty: 90 TABLET | Refills: 0 | OUTPATIENT
Start: 2023-11-03

## 2023-12-28 RX ORDER — LOSARTAN POTASSIUM 50 MG/1
50 TABLET ORAL DAILY
Qty: 90 TABLET | Refills: 0 | Status: SHIPPED | OUTPATIENT
Start: 2023-12-28

## 2023-12-28 NOTE — TELEPHONE ENCOUNTER
MEDICATION REFILL REQUEST:    Future Appointment: 02/06/2024    Last appointment: 10/06/2023    Medication requested:   Requested Prescriptions     Pending Prescriptions Disp Refills    LOSARTAN 48 MG Oral Tab [Pharmacy Med Name: LOSARTAN POTASSIUM 50 MG TAB] 90 tablet 0     Sig: TAKE 1 TABLET BY MOUTH EVERY DAY         Last refill date:    Disp Refills Start End    losartan 50 MG Oral Tab 90 tablet 0 10/31/2023         Protocol Status:     Hypertension Medications Protocol [id number]/28/2023 10:00 AM   Protocol Details CMP or BMP in past 12 months    Last serum creatinine< 2.0    Appointment in past 6 or next 3 months

## 2024-02-06 ENCOUNTER — OFFICE VISIT (OUTPATIENT)
Dept: INTERNAL MEDICINE CLINIC | Facility: CLINIC | Age: 84
End: 2024-02-06
Payer: MEDICARE

## 2024-02-06 ENCOUNTER — HOSPITAL ENCOUNTER (OUTPATIENT)
Dept: GENERAL RADIOLOGY | Facility: HOSPITAL | Age: 84
Discharge: HOME | End: 2024-02-06
Attending: INTERNAL MEDICINE
Payer: MEDICARE

## 2024-02-06 ENCOUNTER — LAB ENCOUNTER (OUTPATIENT)
Dept: LAB | Facility: HOSPITAL | Age: 84
End: 2024-02-06
Attending: INTERNAL MEDICINE
Payer: MEDICARE

## 2024-02-06 VITALS
OXYGEN SATURATION: 94 % | DIASTOLIC BLOOD PRESSURE: 64 MMHG | HEART RATE: 65 BPM | HEIGHT: 71 IN | SYSTOLIC BLOOD PRESSURE: 116 MMHG | BODY MASS INDEX: 23.38 KG/M2 | WEIGHT: 167 LBS

## 2024-02-06 DIAGNOSIS — R63.4 WEIGHT LOSS, ABNORMAL: ICD-10-CM

## 2024-02-06 DIAGNOSIS — E78.5 HYPERLIPIDEMIA, UNSPECIFIED HYPERLIPIDEMIA TYPE: ICD-10-CM

## 2024-02-06 DIAGNOSIS — R05.1 ACUTE COUGH: ICD-10-CM

## 2024-02-06 DIAGNOSIS — R42 DYSEQUILIBRIUM: ICD-10-CM

## 2024-02-06 DIAGNOSIS — F17.200 TOBACCO USE DISORDER: ICD-10-CM

## 2024-02-06 DIAGNOSIS — I71.43 INFRARENAL ABDOMINAL AORTIC ANEURYSM (AAA) WITHOUT RUPTURE (HCC): ICD-10-CM

## 2024-02-06 DIAGNOSIS — N18.31 STAGE 3A CHRONIC KIDNEY DISEASE (HCC): ICD-10-CM

## 2024-02-06 DIAGNOSIS — I73.9 PERIPHERAL VASCULAR DISEASE (HCC): ICD-10-CM

## 2024-02-06 DIAGNOSIS — I10 PRIMARY HYPERTENSION: ICD-10-CM

## 2024-02-06 DIAGNOSIS — I34.0 NONRHEUMATIC MITRAL VALVE REGURGITATION: ICD-10-CM

## 2024-02-06 DIAGNOSIS — J06.9 VIRAL URI: Primary | ICD-10-CM

## 2024-02-06 DIAGNOSIS — Z12.5 PROSTATE CANCER SCREENING: ICD-10-CM

## 2024-02-06 PROBLEM — I49.5 BRADY-TACHY SYNDROME (HCC): Status: RESOLVED | Noted: 2023-10-06 | Resolved: 2024-02-06

## 2024-02-06 LAB
ALBUMIN SERPL-MCNC: 4.1 G/DL (ref 3.2–4.8)
ALBUMIN/GLOB SERPL: 1.6 {RATIO} (ref 1–2)
ALP LIVER SERPL-CCNC: 68 U/L
ALT SERPL-CCNC: <7 U/L
ANION GAP SERPL CALC-SCNC: 8 MMOL/L (ref 0–18)
AST SERPL-CCNC: 12 U/L (ref ?–34)
BASOPHILS # BLD AUTO: 0.05 X10(3) UL (ref 0–0.2)
BASOPHILS NFR BLD AUTO: 0.7 %
BILIRUB SERPL-MCNC: 0.5 MG/DL (ref 0.2–1.1)
BUN BLD-MCNC: 18 MG/DL (ref 9–23)
BUN/CREAT SERPL: 12.9 (ref 10–20)
CALCIUM BLD-MCNC: 9.3 MG/DL (ref 8.7–10.4)
CHLORIDE SERPL-SCNC: 108 MMOL/L (ref 98–112)
CO2 SERPL-SCNC: 25 MMOL/L (ref 21–32)
COMPLEXED PSA SERPL-MCNC: 2.02 NG/ML (ref ?–4)
CREAT BLD-MCNC: 1.39 MG/DL
DEPRECATED RDW RBC AUTO: 45.7 FL (ref 35.1–46.3)
EGFRCR SERPLBLD CKD-EPI 2021: 51 ML/MIN/1.73M2 (ref 60–?)
EOSINOPHIL # BLD AUTO: 0.1 X10(3) UL (ref 0–0.7)
EOSINOPHIL NFR BLD AUTO: 1.3 %
ERYTHROCYTE [DISTWIDTH] IN BLOOD BY AUTOMATED COUNT: 13.3 % (ref 11–15)
FASTING STATUS PATIENT QL REPORTED: NO
GLOBULIN PLAS-MCNC: 2.6 G/DL (ref 2.8–4.4)
GLUCOSE BLD-MCNC: 110 MG/DL (ref 70–99)
HCT VFR BLD AUTO: 36.7 %
HGB BLD-MCNC: 12.2 G/DL
IMM GRANULOCYTES # BLD AUTO: 0.03 X10(3) UL (ref 0–1)
IMM GRANULOCYTES NFR BLD: 0.4 %
LYMPHOCYTES # BLD AUTO: 1.82 X10(3) UL (ref 1–4)
LYMPHOCYTES NFR BLD AUTO: 23.8 %
MCH RBC QN AUTO: 31.4 PG (ref 26–34)
MCHC RBC AUTO-ENTMCNC: 33.2 G/DL (ref 31–37)
MCV RBC AUTO: 94.3 FL
MONOCYTES # BLD AUTO: 0.71 X10(3) UL (ref 0.1–1)
MONOCYTES NFR BLD AUTO: 9.3 %
NEUTROPHILS # BLD AUTO: 4.95 X10 (3) UL (ref 1.5–7.7)
NEUTROPHILS # BLD AUTO: 4.95 X10(3) UL (ref 1.5–7.7)
NEUTROPHILS NFR BLD AUTO: 64.5 %
OSMOLALITY SERPL CALC.SUM OF ELEC: 295 MOSM/KG (ref 275–295)
PLATELET # BLD AUTO: 174 10(3)UL (ref 150–450)
POTASSIUM SERPL-SCNC: 4 MMOL/L (ref 3.5–5.1)
PROT SERPL-MCNC: 6.7 G/DL (ref 5.7–8.2)
RBC # BLD AUTO: 3.89 X10(6)UL
SODIUM SERPL-SCNC: 141 MMOL/L (ref 136–145)
TSI SER-ACNC: 1.24 MIU/ML (ref 0.55–4.78)
WBC # BLD AUTO: 7.7 X10(3) UL (ref 4–11)

## 2024-02-06 PROCEDURE — 84443 ASSAY THYROID STIM HORMONE: CPT

## 2024-02-06 PROCEDURE — 36415 COLL VENOUS BLD VENIPUNCTURE: CPT

## 2024-02-06 PROCEDURE — G0439 PPPS, SUBSEQ VISIT: HCPCS | Performed by: INTERNAL MEDICINE

## 2024-02-06 PROCEDURE — 80053 COMPREHEN METABOLIC PANEL: CPT

## 2024-02-06 PROCEDURE — 85025 COMPLETE CBC W/AUTO DIFF WBC: CPT

## 2024-02-06 PROCEDURE — 71046 X-RAY EXAM CHEST 2 VIEWS: CPT | Performed by: INTERNAL MEDICINE

## 2024-02-06 PROCEDURE — 99214 OFFICE O/P EST MOD 30 MIN: CPT | Performed by: INTERNAL MEDICINE

## 2024-02-06 NOTE — PROGRESS NOTES
Kwabena Spann is a 83 year old  who presents for a Medicare Subsequent Annual Wellness visit (Pt already had Initial Annual Wellness)    Discussed medicare wellness , reviewed assessments and care gaps for screening and immunizations.    In addition medical issues  addressed today included ;  had  virus  for  month of jan  - had  cough  fatigue  anoexia i   diarrhea -  loss  of taste  - family ok    Bp good  discussed  HR -  his low  HR  was  in hospital    no tachycardia -  or palpitations     Hasn't had cig in 3 weeks      No claudication    leg cramps better since  doing exercises      No falls  - still  balance issues -      Discussed  how thin he looks to me  - has lost wt -  appetite not good  last  month -  diarrhea - no obvious cause  - does get full easy       Allergies:   has No Known Allergies.  Medical History:    has a past medical history of Hyperlipidemia.  Surgical History:    has a past surgical history that includes hernia surgery and cyst removal.   Family History:   family history is not on file.  Social History:    reports that he has quit smoking. His smoking use included cigarettes. He started smoking about 3 weeks ago. He has never used smokeless tobacco. He reports current alcohol use. He reports that he does not use drugs.        Fall Risk Assessment:   He has been screened for Falls and is High Risk. Fall Prevention information provided to patient in After Visit Summary.    Do you feel unsteady when standing or walking?: Yes  Do you worry about falling?: Yes  Have you fallen in the past year?: No     Cognitive Assessment:   Abnormal  What day of the week is this?: Incorrect  What month is it?: Correct  What year is it?: Correct  Recall \"Ball\": Correct  Recall \"Flag\": Correct  Recall \"Tree\": Correct    No  issues  with  cognition or memoory    Functional Ability/Status:   Kwabena Spann has some abnormal functions as listed below:  He has Walking problems based on screening of  functional status. He has problems with Daily Activities based on screening of functional status. He has problems with Memory based on screening of functional status.     Has  balance and  leg weakness        Depression Screening (PHQ-2/PHQ-9): Little interest or pleasure in doing things: 2    Feeling down, depressed, or hopeless: 1    No clinicl depression -  virus  was  issue -      Advanced Directives:   DNR  - no heroic measure - discussed is old enough to die     Tobacco:  He smoked tobacco in the past but quit greater than 12 months ago.  Social History    Tobacco Use      Smoking status: Former        Types: Cigarettes        Start date: 1/16/2024      Smokeless tobacco: Never      Tobacco comments: 4-6        CAGE Alcohol Screen:   CAGE screening score of 0 on 2/6/2024, showing low risk of alcohol abuse.          Patient Care Team:  Josef Boyce MD as PCP - General (Internal Medicine)    Objective:   /64 (BP Location: Right arm, Patient Position: Sitting, Cuff Size: adult)   Pulse 65   Ht 5' 11\" (1.803 m)   Wt 167 lb (75.8 kg)   SpO2 94%   BMI 23.29 kg/m²    Estimated body mass index is 23.29 kg/m² as calculated from the following:    Height as of this encounter: 5' 11\" (1.803 m).    Weight as of this encounter: 167 lb (75.8 kg).        Wt Readings from Last 3 Encounters:   02/06/24 167 lb (75.8 kg)   10/06/23 183 lb (83 kg)   06/06/23 190 lb (86.2 kg)       Looks thin to me  neuro cog ok     Color ok      Head/neck ; nl - no nodes     Lungs: Clear     Abd no mass felt      Heart: Regular- 2/6 m     Ext; nl     Neurological: No focal deficit, nl cognition         Medicare Hearing Assessment:  Hearing Screening    Time taken: 2/6/2024 11:17 AM  Entry User: Josef Boyce MD  Screening Method: Finger Rub  Finger Rub Result: Pass             Visual Acuity:   Visual Acuity:   Right Eye Visual Acuity: Corrected Right Eye Chart Acuity: 20/25   Left Eye Visual Acuity: Corrected Left Eye Chart  Acuity: 20/25   Both Eyes Visual Acuity: Corrected Both Eyes Chart Acuity: 20/25   Able To Tolerate Visual Acuity: Yes        Current Outpatient Medications:     LOSARTAN 50 MG Oral Tab, TAKE 1 TABLET BY MOUTH EVERY DAY, Disp: 90 tablet, Rfl: 0    simvastatin 40 MG Oral Tab, Take 1 tablet (40 mg total) by mouth nightly., Disp: 90 tablet, Rfl: 0    metoprolol succinate ER 25 MG Oral Tablet 24 Hr, Take 1 tablet (25 mg total) by mouth daily., Disp: , Rfl:     clopidogrel 75 MG Oral Tab, Take 1 tablet (75 mg total) by mouth daily., Disp: , Rfl:      ASSESSMENT  and   PLAN    Medicare wellness  Dicussed prevention screening test and immunization for age  Reinforced healthy diet, lifestyle, and exercise. Discussed current state of health.    Medical issues     1. Viral URI (Primary)- suspect had covid  - now on the mend -  - wt loss my be in part   2. Infrarenal abdominal aortic aneurysm (AAA) without rupture (HCC)- stable  no sx   Overview:  S/p EVAR  3. Hyperlipidemia, unspecified hyperlipidemia type- cpm : simvas  4. Peripheral vascular disease (HCC)- stable no sxs   5. Tobacco use disorder- no off for  3 weeks   6. Dysequilibrium-  is aware of  fall risk - consider PT  - discussed   7. Stage 3a chronic kidney disease (HCC)- will recheck labs   keep bp controlled   8. Primary hypertension- at goal cpm   9. Nonrheumatic mitral valve regurgitation-  no sx  m slight   Overview:  Echo 8/22  Mild to moderate   10. Weight loss, abnormal-  Im concerend about  systemic illness ie cancer  - check labs and  cxr     Last scope  about  7 yrs ago                  No follow-ups on file.     Josef Boyce MD      General Health:  In the past six months, have you lost more than 10 pounds without trying?: 3 - Don't know  Has your appetite been poor?: Yes  Type of Diet: Balanced  How does the patient maintain a good energy level?: Appropriate Exercise;Stretching  How would you describe your daily physical activity?: Light  How would  you describe your current health state?: Poor  How do you maintain positive mental well-being?: Visiting Family  On a scale of 0 to 10, with 0 being no pain and 10 being severe pain, what is your pain level?: 5 - (Moderate)  In the past six months, have you experienced urine leakage?: 0-No  At any time do you feel concerned for the safety/well-being of yourself and/or your children, in your home or elsewhere?: No  Have you had any immunizations at another office such as Influenza, Hepatitis B, Tetanus, or Pneumococcal?: Yes          Kwabena Spann's SCREENING SCHEDULE   Tests on this list are recommended by your physician but may not be covered, or covered at this frequency, by your insurer.   Please check with your insurance carrier before scheduling to verify coverage.   PREVENTATIVE SERVICES FREQUENCY &  COVERAGE DETAILS LAST COMPLETION DATE   Diabetes Screening    Fasting Blood Sugar / Glucose    One screening every 12 months if never tested or if previously tested but not diagnosed with pre-diabetes   One screening every 6 months if diagnosed with pre-diabetes Lab Results   Component Value Date     (H) 04/05/2023        Cardiovascular Disease Screening    Lipid Panel  Cholesterol  Lipoprotein (HDL)  Triglycerides Covered every 5 years for all Medicare beneficiaries without apparent signs or symptoms of cardiovascular disease Lab Results   Component Value Date    CHOLEST 134 04/05/2023    HDL 56 04/05/2023    LDL 61 04/05/2023    TRIG 89 04/05/2023         Electrocardiogram (EKG)   Covered if needed at Welcome to Medicare, and non-screening if indicated for medical reasons -      Ultrasound Screening for Abdominal Aortic Aneurysm (AAA) Covered once in a lifetime for one of the following risk factors    Men who are 65-75 years old and have ever smoked    Anyone with a family history -     Colorectal Cancer Screening  Covered for ages 50-85; only need ONE of the following:    Colonoscopy   Covered  every 10 years    Covered every 2 years if patient is at high risk or previous colonoscopy was abnormal -    No recommendations at this time    Flexible Sigmoidoscopy   Covered every 4 years -    Fecal Occult Blood Test Covered annually -   Prostate Cancer Screening    Prostate-Specific Antigen (PSA) Annually No results found for: \"PSA\"  There are no preventive care reminders to display for this patient.   Immunizations    Influenza Covered once per flu season  Please get every year -  Influenza Vaccine(1) due on 10/01/2023    Pneumococcal Each vaccine ([id number] & [id number]) covered once after 65 Prevnar 13: 10/27/2017    [id number]: 10/04/2021     No recommendations at this time    Hepatitis B One screening covered for patients with certain risk factors   -  No recommendations at this time    Tetanus Toxoid Not covered by Medicare Part B unless medically necessary (cut with metal); may be covered with your pharmacy prescription benefits -    Tetanus, Diptheria and Pertusis TD and TDaP Not covered by Medicare Part B -  No recommendations at this time    Zoster Not covered by Medicare Part B; may be covered with your pharmacy  prescription benefits -  Zoster Vaccines(2 of 2) due on 11/24/2023     Annual Monitoring of Persistent Medications (ACE/ARB, digoxin diuretics, anticonvulsants)    Potassium Annually Lab Results   Component Value Date    K 3.8 04/05/2023         Creatinine   Annually Lab Results   Component Value Date    CREATSERUM 1.26 04/05/2023         BUN Annually Lab Results   Component Value Date    BUN 20 (H) 04/05/2023       Drug Serum Conc Annually No results found for: \"DIGOXIN\", \"DIG\", \"VALP\"

## 2024-02-29 ENCOUNTER — OFFICE VISIT (OUTPATIENT)
Dept: INTERNAL MEDICINE CLINIC | Facility: CLINIC | Age: 84
End: 2024-02-29
Payer: MEDICARE

## 2024-02-29 VITALS
DIASTOLIC BLOOD PRESSURE: 60 MMHG | HEART RATE: 60 BPM | WEIGHT: 168 LBS | BODY MASS INDEX: 23.52 KG/M2 | SYSTOLIC BLOOD PRESSURE: 110 MMHG | HEIGHT: 71 IN | OXYGEN SATURATION: 99 %

## 2024-02-29 DIAGNOSIS — R63.4 WEIGHT LOSS, NON-INTENTIONAL: ICD-10-CM

## 2024-02-29 DIAGNOSIS — R68.81 EARLY SATIETY: ICD-10-CM

## 2024-02-29 DIAGNOSIS — R10.84 GENERALIZED ABDOMINAL PAIN: Primary | ICD-10-CM

## 2024-02-29 DIAGNOSIS — I71.43 INFRARENAL ABDOMINAL AORTIC ANEURYSM (AAA) WITHOUT RUPTURE (HCC): ICD-10-CM

## 2024-02-29 DIAGNOSIS — K52.832 LYMPHOCYTIC COLITIS: ICD-10-CM

## 2024-02-29 PROCEDURE — 99214 OFFICE O/P EST MOD 30 MIN: CPT | Performed by: INTERNAL MEDICINE

## 2024-02-29 NOTE — PROGRESS NOTES
Kwabena Spann male 83 year old         Chief Complaint   Patient presents with    Hypertension    Bloating    Abdominal Pain     F/u of  wt loss       Has put on 1 lb  can't eat as much has bloating  and gets full easily     Used to eat 3 porkchops  now cant  eat  2  - feels full down low with  pain (discomfort )        Has noticed  BM  -seems like constipated or  diarrhea      Has had wt loss issues in past at [facility]    Last scope 2020  endo and colon - lymphocytic colitis      Had  aneurysm repair EVAR 2017  and  w/u for  mesenteric duplex  2021    ? Small bowel obstruction 2021     Patient Active Problem List   Diagnosis    Abdominal aortic aneurysm (AAA) without rupture (HCC)    Hyperlipidemia    Peripheral vascular disease (HCC)    Tobacco use disorder    Leg cramps    Dysequilibrium    Stage 3 chronic kidney disease (HCC)    Primary hypertension    Nonrheumatic mitral valve regurgitation          Current Outpatient Medications on File Prior to Visit   Medication Sig Dispense Refill    LOSARTAN 50 MG Oral Tab TAKE 1 TABLET BY MOUTH EVERY DAY 90 tablet 0    simvastatin 40 MG Oral Tab Take 1 tablet (40 mg total) by mouth nightly. 90 tablet 0    metoprolol succinate ER 25 MG Oral Tablet 24 Hr Take 1 tablet (25 mg total) by mouth daily.      clopidogrel 75 MG Oral Tab Take 1 tablet (75 mg total) by mouth daily.       No current facility-administered medications on file prior to visit.          Vitals:    02/29/24 1050   BP: 110/60   Pulse: 60   VITALSBody mass index is 23.43 kg/m².        Wt Readings from Last 3 Encounters:   02/29/24 168 lb (76.2 kg)   02/06/24 167 lb (75.8 kg)   10/06/23 183 lb (83 kg)           Pertinent findings on the physical exam; thin abd soft non tender  cor reg      There are no diagnoses linked to this encounter.         Will get CT scan  for  abd pain  , early satiety and  possible  mesenteric ischemia  -  will also use po contrast for fullness -  . Will also evaluate  aorta   s/p evar      Discussed mild  ckd  - and risk of  contrast  - push fluids -      This note was prepared using Dragon Medical voice recognition dictation software and as a result, errors may occur. When identified, these errors have been corrected. While every attempt is made to correct errors during dictation, discrepancies may still exist

## 2024-03-01 ENCOUNTER — TELEPHONE (OUTPATIENT)
Facility: CLINIC | Age: 84
End: 2024-03-01

## 2024-03-01 NOTE — TELEPHONE ENCOUNTER
Patient's spouse calling to schedule something sooner, declined with anyone else requested Dr Chung. Scheduled for 10/21/24 and is coming due to abdominal pain and weight loss. Please call.

## 2024-03-01 NOTE — TELEPHONE ENCOUNTER
Spoke to patient and wife  Name and  verified    Scheduled him for first available with Clarita Parr.  Location, date and time confirmed    Your Appointments      2024 11:00 AM  Consult with ABBI Gross  [facility] ([facility]) [facility] [place] [address] [address]  [phone]

## 2024-03-12 ENCOUNTER — HOSPITAL ENCOUNTER (OUTPATIENT)
Dept: CT IMAGING | Facility: HOSPITAL | Age: 84
Discharge: HOME | End: 2024-03-12
Attending: INTERNAL MEDICINE
Payer: MEDICARE

## 2024-03-12 DIAGNOSIS — R10.84 GENERALIZED ABDOMINAL PAIN: ICD-10-CM

## 2024-03-12 DIAGNOSIS — R68.81 EARLY SATIETY: ICD-10-CM

## 2024-03-12 DIAGNOSIS — R63.4 WEIGHT LOSS, NON-INTENTIONAL: ICD-10-CM

## 2024-03-12 DIAGNOSIS — I71.43 INFRARENAL ABDOMINAL AORTIC ANEURYSM (AAA) WITHOUT RUPTURE (HCC): ICD-10-CM

## 2024-03-12 LAB
CREAT BLD-MCNC: 1.4 MG/DL
EGFRCR SERPLBLD CKD-EPI 2021: 50 ML/MIN/1.73M2 (ref 60–?)

## 2024-03-12 PROCEDURE — 82565 ASSAY OF CREATININE: CPT

## 2024-03-12 PROCEDURE — 74178 CT ABD&PLV WO CNTR FLWD CNTR: CPT | Performed by: INTERNAL MEDICINE

## 2024-03-25 RX ORDER — LOSARTAN POTASSIUM 50 MG/1
50 TABLET ORAL DAILY
Qty: 90 TABLET | Refills: 0 | Status: SHIPPED | OUTPATIENT
Start: 2024-03-25

## 2024-04-17 ENCOUNTER — OFFICE VISIT (OUTPATIENT)
Facility: CLINIC | Age: 84
End: 2024-04-17

## 2024-04-17 ENCOUNTER — TELEPHONE (OUTPATIENT)
Facility: CLINIC | Age: 84
End: 2024-04-17

## 2024-04-17 VITALS
HEART RATE: 71 BPM | BODY MASS INDEX: 21.7 KG/M2 | WEIGHT: 155 LBS | DIASTOLIC BLOOD PRESSURE: 79 MMHG | HEIGHT: 71 IN | SYSTOLIC BLOOD PRESSURE: 124 MMHG

## 2024-04-17 DIAGNOSIS — R63.4 WEIGHT LOSS, NON-INTENTIONAL: ICD-10-CM

## 2024-04-17 DIAGNOSIS — R63.0 APPETITE LOSS: ICD-10-CM

## 2024-04-17 DIAGNOSIS — R68.81 EARLY SATIETY: ICD-10-CM

## 2024-04-17 DIAGNOSIS — R10.33 PERIUMBILICAL ABDOMINAL PAIN: Primary | ICD-10-CM

## 2024-04-17 DIAGNOSIS — R93.89 ABNORMAL CT SCAN: ICD-10-CM

## 2024-04-17 DIAGNOSIS — R10.9 ABDOMINAL PAIN, UNSPECIFIED ABDOMINAL LOCATION: ICD-10-CM

## 2024-04-17 DIAGNOSIS — R63.4 WEIGHT LOSS: Primary | ICD-10-CM

## 2024-04-17 DIAGNOSIS — Z86.010 HX OF COLONIC POLYP: ICD-10-CM

## 2024-04-17 DIAGNOSIS — R19.8 IRREGULAR BOWEL HABITS: ICD-10-CM

## 2024-04-17 DIAGNOSIS — Z86.010 PERSONAL HISTORY OF COLONIC POLYPS: ICD-10-CM

## 2024-04-17 DIAGNOSIS — R93.5 ABNORMAL CT OF THE ABDOMEN: ICD-10-CM

## 2024-04-17 PROCEDURE — 99204 OFFICE O/P NEW MOD 45 MIN: CPT

## 2024-04-17 NOTE — H&P
[facility] - Gastroenterology                                                                                                               Reason for consult: abd pain    Requesting physician or provider: Josef Boyce MD    Chief Complaint   Patient presents with    Consult     Abdominal pain; constipation;        HPI:   Kwabena Spann is a 83 year old year-old male with active diagnoses including of AAA s/p EVAR ( on Plavix), anemia, HTN, HLD, PVD, CKD. Prior medical/surgical history includes SBO in 2021 in table below.    he is here today for evaluation  #abdominal pain  #irregular bowel habits  #weight loss  -he reports daily periumbilical abdominal pain x 3 months that lasts 1-3 hours at a time. He will try warm milk, alk seltzer for the pain which sometimes help. Also feels full with small amounts of food. Reports lower appetite and about 10lb weight loss the past 2 months  -the past few months he has had constipation with bowel movement every 2-3 days with intermittent diarrhea. Occasionally will have a soft formed stool. Baseline bowel habits previously were bowel movements every other day with occasional constipation or diarrhea  -2/6/24 labs: mild normocytic anemia, hgb 12.2    Last saw GI at [place] in 2021:  1. SBO: resolved, stable with symptoms. CT abd with mesenteric infiltration  Consider CT enterography, f/u in 3 months    2 . Lymphocytic colitis s/p Rx with 8-week course of Budesonide : patient is in remission  - Avoid NSAIDs     3. Normocytic anemia: unclear etiology , normal iron and ferritin     4. Post-prandial bloating: resolving with diet and lifestyle modification. Tums as needed  - Small frequent meals  - Low fat diet     5. Colon cancer screening/surveillance: patient is average risk for CRC, > 3 adenomas were resected during his last colonoscopy in 09/20  - Surveillance colonoscopy in  09/2023     Patient denies symptoms of nausea, vomiting, dyspepsia, dysphagia, odynophagia, globus sensation, heartburn, hematemesis, hematochezia, or melena. he denies recent fever.    Last colonoscopy: 2020 @ [place] - 3 year recall  -4 tubular adenomas  -lymphocytic colitis  -diverticulosis  -internal & external hemorrhoids    Last EGD: 2020 @ [place]  - Normal esophagus.  - Z-line regular, 45 cm from the incisors.  - Gastritis.  - Normal examined duodenum. Biopsied.  - Biopsies were taken in the stomach with a cold forceps for histology and  Helicobacter pylori testing.     FINAL DIAGNOSIS   A.  SMALL BOWEL; BIOPSY:   -DUODENAL MUCOSA WITHIN NORMAL LIMITS   -PRESERVED VILLOUS ARCHITECTURE AND NO INCREASE IN INTRAEPITHELIAL LYMPHOCYTES     B.  GASTRIC; BIOPSY:   -ANTRAL AND OXYNTIC TYPE MUCOSA WITH MILD CHRONIC INACTIVE GASTRITIS   -NEGATIVE FOR H. PYLORI     NSAIDS:none   Tobacco: former  Alcohol:  Marijuana: none   Illicit drugs: none     FH GI malignancy: none   FH IBD: none     No history of adverse reaction to sedation  No RIP  YES anticoagulants/antiplatelet - clopidogrel (Plavix)   No pacemaker/defibrillator    Wt Readings from Last 6 Encounters:   04/17/24 155 lb (70.3 kg)   02/29/24 168 lb (76.2 kg)   02/06/24 167 lb (75.8 kg)   10/06/23 183 lb (83 kg)   06/06/23 190 lb (86.2 kg)   02/06/23 186 lb 6 oz (84.5 kg)        History, Medications, Allergies, ROS:      Past Medical History:    Hyperlipidemia      Past Surgical History:   Procedure Laterality Date    Cyst removal      Hernia surgery        Family Hx: No family history on file.   Social History:   Social History     Socioeconomic History    Marital status:    Tobacco Use    Smoking status: Former     Types: Cigarettes     Start date: 1/16/2024    Smokeless tobacco: Never    Tobacco comments:     4-6    Vaping Use    Vaping status: Never Used   Substance and Sexual Activity    Alcohol use: Yes     Comment: 1 of day    Drug use: Never   Other  Topics Concern    Weight Concern No        Medications (Active prior to today's visit):  Current Outpatient Medications   Medication Sig Dispense Refill    losartan 50 MG Oral Tab Take 1 tablet (50 mg total) by mouth daily. 90 tablet 0    simvastatin 40 MG Oral Tab Take 1 tablet (40 mg total) by mouth nightly. 90 tablet 0    metoprolol succinate ER 25 MG Oral Tablet 24 Hr Take 1 tablet (25 mg total) by mouth daily.      clopidogrel 75 MG Oral Tab Take 1 tablet (75 mg total) by mouth daily.         Allergies:  No Known Allergies    ROS:   CONSTITUTIONAL: negative for fevers, chills, sweats  EYES Negative for scleral icterus or redness, and diplopia  HEENT: Negative for hoarseness  RESPIRATORY: Negative for cough and severe shortness of breath  CARDIOVASCULAR: Negative for crushing sub-sternal chest pain  GASTROINTESTINAL: See HPI  GENITOURINARY: Negative for dysuria  MUSCULOSKELETAL: Negative for arthralgias and myalgias  SKIN: Negative for jaundice, rash or pruritus  NEUROLOGICAL: Negative for dizziness and headaches  BEHAVIOR/PSYCH: Negative for psychotic behavior    PHYSICAL EXAM:   Blood pressure 124/79, pulse 71, height 5' 11\" (1.803 m), weight 155 lb (70.3 kg).    GEN: Alert, no acute distress, well-nourished   HEENT: anicteric sclera, neck supple, trachea midline, MMM, no palpable or tender neck or supraclavicular lymph nodes  CV: RRR, the extremities are warm and well perfused   LUNGS: No increased work of breathing, CTAB  ABDOMEN: Soft, symmetrical, non-tender without distention or guarding. No scars or lesions. Aorta is without bruit or visible pulsation. Umbilicus is midline without herniation. Normoactive bowel sounds are present, No masses, hepatomegaly or splenomegaly noted.  MSK: No erythema, no warmth, no swelling of joints  SKIN: No jaundice, no erythema, no rashes, no lesions  HEMATOLOGIC: No bleeding, no bruising  NEURO: Alert and interactive, JOHNS  PSYCH: appropriate mood &  affect    Labs/Imaging/Procedures:     Patient's pertinent labs and imaging were reviewed and discussed with patient today.        .  ASSESSMENT/PLAN:   Kwabena Spann is a 83 year old year-old male with active diagnoses including of AAA s/p EVAR ( on Plavix), anemia, HTN, HLD, PVD, CKD. Prior medical/surgical history includes SBO in 2021 in table below.    he is here today for evaluation  #abdominal pain  #irregular bowel habits  #weight loss  -he reports daily periumbilical abdominal pain x 3 months that lasts 1-3 hours at a time. He will try warm milk, alk seltzer for the pain which sometimes help. Also feels full with small amounts of food. Reports lower appetite and about 10lb weight loss the past 2 months  -the past few months he has had constipation with bowel movement every 2-3 days with intermittent diarrhea. Occasionally will have a soft formed stool. Baseline bowel habits previously were bowel movements every other day with occasional constipation or diarrhea  -2/6/24 labs: mild normocytic anemia, hgb 12.2  -prior EGD/CLN in 2020 @ [place]. EGD + gastritis, CLN with 4 polyps and lymphocytic colitis.     -he had CT abd/pelvis on 3/12/24 with finding of: Mild circumferential bowel wall thickening involving two sections the distal ileum with associated prominence of the adjacent vasa recta and a few small soft tissue nodules with the largest measuring 1.5 cm in the right lower quadrant of the abdomen.   Differential diagnosis for these findings include sequela of prior inflammatory enteritis with adjacent lymph nodes or carcinoid tumor with adjacent desmoplastic reaction.     -etiology of above finding unclear, will repeat EGD/CLN to rule out malignancy given red flag symptoms of change in bowel habits, unintentional weight loss, anorexia. Will treat constipation.     Recommendations:  For constipation/diarrhea    -start taking daily fiber supplement (psyllium husk such as metamucil)    -start taking  daily probiotic (such as culturelle, align, florastor)    -for at least 2 weeks take 1 capful once to twice every day miralax (osmotic laxative). Can continue 1 capful once daily if you feel like it is helping    -follow up with me 1 month after endoscopy       1. Schedule EGD & colonoscopy with Dr. Perry, Dr. Chung or Dr. Freeman   Diagnosis: periumbilical abd pain, changes in bowel habits, unintentional weight loss, anorexia, abnl CT of abdomen  Sedation: MAC  Prep: split dose golytely    2. MEDICATION CHANGES PRIOR TO PROCEDURE    GI RNs please reach out to cardiology Dr Woodruff for approval to hold clopidogrel (Plavix) for 5 days prior to procedure    ENDOSCOPIC RISK BENEFIT DISCUSSION: I described the procedure in great detail with the patient. I discussed the risks and benefits, including but not limited to: bleeding, perforation, infection, anesthesia complications, and even death. Patient will be NPO after midnight and will have a person physically present at time of pick-up to drive patient home. Patient verbalized understanding and agrees to proceed with procedure as planned.          Orders This Visit:  No orders of the defined types were placed in this encounter.      Meds This Visit:  Requested Prescriptions      No prescriptions requested or ordered in this encounter       Imaging & Referrals:  [facility] - INTERNAL      ABBI Gross    [facility]  4/17/2024        This note was partially prepared using Dragon Medical voice recognition dictation software. As a result, errors may occur. When identified, these errors have been corrected. While every attempt is made to correct errors during dictation, discrepancies may still exist.

## 2024-04-17 NOTE — PATIENT INSTRUCTIONS
For constipation/diarrhea    -start taking daily fiber supplement (psyllium husk such as metamucil)    -start taking daily probiotic (such as culturelle, align, florastor)    -for at least 2 weeks take 1 capful once to twice every day miralax (osmotic laxative). Can continue 1 capful once daily if you feel like it is helping    -follow up with me 1 month after endoscopy         1. Schedule EGD & colonoscopy with Dr. Perry, Dr. Chung or Dr. Freeman   Diagnosis: periumbilical abd pain, changes in bowel habits, unintentional weight loss, anorexia, abnl CT of abdomen  Sedation: MAC  Prep: split dose golytely    2. MEDICATION CHANGES PRIOR TO PROCEDURE    GI RNs please reach out to cardiology Dr Woodruff for approval to hold clopidogrel (Plavix) for 5 days prior to procedure    3.  bowel prep from pharmacy   You can pick the bowel prep up now and store in a cool, dry place in your home until your scheduled bowel prep start date.    4. Read all bowel prep instructions carefully. Bowel prep instructions can also be found online at:  [email or web address].org/giprep     5. AVOID seeds, nuts, popcorn, raw fruits and vegetables for 5 days before procedure    6. If you start any NEW medication after your visit today, please notify us. Certain medications (like iron or weight loss medications) will need to be held before the procedure, or the procedure cannot be performed safely.

## 2024-04-17 NOTE — TELEPHONE ENCOUNTER
GI RN- Please advise on plavix orders. Patient was put on wait list so we might be moving his procedure up.    Dr Woodruff

## 2024-04-17 NOTE — TELEPHONE ENCOUNTER
Scheduled for:  Colonoscopy 91679/EGD 47452  Provider Name:  Dr Chung  Date:  08/20/2024  Location:  [facility]  Sedation:  MAC  Time:  1:45pm (pt is aware to arrive at 12:45pm)    Prep:  Colyte  Meds/Allergies Reconciled?:  Physician reviewed  Diagnosis with codes:  Weight Loss R63.4; Appetite Loss R63.0; Irregular bowls R19.8; Abnormal CT R93.5; Hx of colon polyps Z86.010 Abdominal Pain R10.33  Was patient informed to call insurance with codes (Y/N):       Referral sent?:  Referral was sent at the time of electronic surgical scheduling.    [facility] or [facility] notified?:  I sent an electronic request to Endo Scheduling and received a confirmation today.   Medication Orders:  Patient is aware to NOT take iron pills, herbal meds and diet supplements for 7 days before exam. Also to NOT take any form of alcohol, recreational drugs and any forms of ED meds 24-72 hours before exam.     Misc Orders:  Message routed to GI RN regarding Plavix.     Further instructions given by staff:  I discussed the prep intructions with the patient in office which HE verbally understood. Copy of instructions was handed to patient as well. Patient was also advised about cancellation policy.       Alert and oriented to person, place and time/Symptoms improved

## 2024-04-18 NOTE — TELEPHONE ENCOUNTER
Holding orders faxed to Dr. Boyce's office    Fax# [phone]  Fax confirmation received    Pt needs to begin holding plavix on 04/25/2024    Awaiting response

## 2024-04-18 NOTE — TELEPHONE ENCOUNTER
REScheduled for:  Colonoscopy 53752/EGD 71043  Provider Name:  Dr Chung  Date:  From:08/20/2024 To: 4/30/2024  Location:  [facility]  Sedation:  MAC  Time:  From:1:45pm To: 0815(pt is aware to arrive at 0715)     Prep:  Colyte  Meds/Allergies Reconciled?:  Physician reviewed  Diagnosis with codes:  Weight Loss R63.4; Appetite Loss R63.0; Irregular bowls R19.8; Abnormal CT R93.5; Hx of colon polyps Z86.010 Abdominal Pain R10.33  Was patient informed to call insurance with codes (Y/N):       Referral sent?:  Referral was sent at the time of electronic surgical scheduling.     [facility] or [facility] notified?:  I sent an electronic CHANGE request to Endo Scheduling and received a confirmation today.   Medication Orders:  Patient is aware to NOT take iron pills, herbal meds and diet supplements for 7 days before exam. Also to NOT take any form of alcohol, recreational drugs and any forms of ED meds 24-72 hours before exam.      Misc Orders:  Message routed to GI RN regarding Plavix.     Further instructions given by staff:  I discussed the prep intructions with the patient in office which HE verbally understood. Copy of instructions was handed to patient as well. Patient was also advised about cancellation policy.

## 2024-04-19 ENCOUNTER — TELEPHONE (OUTPATIENT)
Dept: INTERNAL MEDICINE CLINIC | Facility: CLINIC | Age: 84
End: 2024-04-19

## 2024-04-19 NOTE — TELEPHONE ENCOUNTER
Spoke to Pt, Dr Boyce has instructed for him to hold the Plavix starting 5 days before his procedure.

## 2024-04-19 NOTE — TELEPHONE ENCOUNTER
Received order from Dr Boyce via fax.    Hold plavix for 5 days and clearance to proceed with procedure.    Letter sent for scanning.

## 2024-04-22 NOTE — TELEPHONE ENCOUNTER
Called the patient, I spoke to his spouse, Malissa (on [facility]), patient /name verified.    I instructed Malissa to let the patient stop taking plavix for 5 days starting 2024.    She wrote the instruction and read back correctly.

## 2024-04-30 ENCOUNTER — ANESTHESIA EVENT (OUTPATIENT)
Dept: ENDOSCOPY | Facility: HOSPITAL | Age: 84
End: 2024-04-30
Payer: MEDICARE

## 2024-04-30 ENCOUNTER — HOSPITAL ENCOUNTER (OUTPATIENT)
Facility: HOSPITAL | Age: 84
Setting detail: HOSPITAL OUTPATIENT SURGERY
Discharge: HOME | End: 2024-04-30
Attending: INTERNAL MEDICINE | Admitting: INTERNAL MEDICINE
Payer: MEDICARE

## 2024-04-30 ENCOUNTER — ANESTHESIA (OUTPATIENT)
Dept: ENDOSCOPY | Facility: HOSPITAL | Age: 84
End: 2024-04-30
Payer: MEDICARE

## 2024-04-30 VITALS
TEMPERATURE: 98 F | HEIGHT: 72 IN | DIASTOLIC BLOOD PRESSURE: 71 MMHG | OXYGEN SATURATION: 100 % | RESPIRATION RATE: 13 BRPM | SYSTOLIC BLOOD PRESSURE: 132 MMHG | BODY MASS INDEX: 20.99 KG/M2 | WEIGHT: 155 LBS | HEART RATE: 57 BPM

## 2024-04-30 DIAGNOSIS — R10.9 ABDOMINAL PAIN, UNSPECIFIED ABDOMINAL LOCATION: ICD-10-CM

## 2024-04-30 DIAGNOSIS — R63.4 WEIGHT LOSS: ICD-10-CM

## 2024-04-30 DIAGNOSIS — R63.0 APPETITE LOSS: ICD-10-CM

## 2024-04-30 DIAGNOSIS — R93.89 ABNORMAL CT SCAN: ICD-10-CM

## 2024-04-30 DIAGNOSIS — Z86.010 HX OF COLONIC POLYP: ICD-10-CM

## 2024-04-30 DIAGNOSIS — R19.8 IRREGULAR BOWEL HABITS: ICD-10-CM

## 2024-04-30 PROCEDURE — 0DBL8ZX EXCISION OF TRANSVERSE COLON, VIA NATURAL OR ARTIFICIAL OPENING ENDOSCOPIC, DIAGNOSTIC: ICD-10-PCS | Performed by: INTERNAL MEDICINE

## 2024-04-30 PROCEDURE — 0DB78ZX EXCISION OF STOMACH, PYLORUS, VIA NATURAL OR ARTIFICIAL OPENING ENDOSCOPIC, DIAGNOSTIC: ICD-10-PCS | Performed by: INTERNAL MEDICINE

## 2024-04-30 PROCEDURE — 0DB48ZX EXCISION OF ESOPHAGOGASTRIC JUNCTION, VIA NATURAL OR ARTIFICIAL OPENING ENDOSCOPIC, DIAGNOSTIC: ICD-10-PCS | Performed by: INTERNAL MEDICINE

## 2024-04-30 PROCEDURE — 45380 COLONOSCOPY AND BIOPSY: CPT | Performed by: INTERNAL MEDICINE

## 2024-04-30 PROCEDURE — 45385 COLONOSCOPY W/LESION REMOVAL: CPT | Performed by: INTERNAL MEDICINE

## 2024-04-30 PROCEDURE — 0DBE8ZX EXCISION OF LARGE INTESTINE, VIA NATURAL OR ARTIFICIAL OPENING ENDOSCOPIC, DIAGNOSTIC: ICD-10-PCS | Performed by: INTERNAL MEDICINE

## 2024-04-30 PROCEDURE — 0DB58ZX EXCISION OF ESOPHAGUS, VIA NATURAL OR ARTIFICIAL OPENING ENDOSCOPIC, DIAGNOSTIC: ICD-10-PCS | Performed by: INTERNAL MEDICINE

## 2024-04-30 PROCEDURE — 0DBC8ZX EXCISION OF ILEOCECAL VALVE, VIA NATURAL OR ARTIFICIAL OPENING ENDOSCOPIC, DIAGNOSTIC: ICD-10-PCS | Performed by: INTERNAL MEDICINE

## 2024-04-30 PROCEDURE — 43239 EGD BIOPSY SINGLE/MULTIPLE: CPT | Performed by: INTERNAL MEDICINE

## 2024-04-30 PROCEDURE — 0DBH8ZX EXCISION OF CECUM, VIA NATURAL OR ARTIFICIAL OPENING ENDOSCOPIC, DIAGNOSTIC: ICD-10-PCS | Performed by: INTERNAL MEDICINE

## 2024-04-30 PROCEDURE — 0DB98ZX EXCISION OF DUODENUM, VIA NATURAL OR ARTIFICIAL OPENING ENDOSCOPIC, DIAGNOSTIC: ICD-10-PCS | Performed by: INTERNAL MEDICINE

## 2024-04-30 RX ORDER — SIMVASTATIN 40 MG
40 TABLET ORAL NIGHTLY
Qty: 90 TABLET | Refills: 0 | Status: SHIPPED | OUTPATIENT
Start: 2024-04-30

## 2024-04-30 RX ORDER — LIDOCAINE HYDROCHLORIDE 10 MG/ML
INJECTION, SOLUTION EPIDURAL; INFILTRATION; INTRACAUDAL; PERINEURAL AS NEEDED
Status: DISCONTINUED | OUTPATIENT
Start: 2024-04-30 | End: 2024-04-30 | Stop reason: SURG

## 2024-04-30 RX ORDER — SODIUM CHLORIDE, SODIUM LACTATE, POTASSIUM CHLORIDE, CALCIUM CHLORIDE 600; 310; 30; 20 MG/100ML; MG/100ML; MG/100ML; MG/100ML
INJECTION, SOLUTION INTRAVENOUS CONTINUOUS
Status: DISCONTINUED | OUTPATIENT
Start: 2024-04-30 | End: 2024-04-30

## 2024-04-30 RX ORDER — NALOXONE HYDROCHLORIDE 0.4 MG/ML
0.08 INJECTION, SOLUTION INTRAMUSCULAR; INTRAVENOUS; SUBCUTANEOUS ONCE AS NEEDED
Status: DISCONTINUED | OUTPATIENT
Start: 2024-04-30 | End: 2024-04-30

## 2024-04-30 RX ADMIN — LIDOCAINE HYDROCHLORIDE 25 MG: 10 INJECTION, SOLUTION EPIDURAL; INFILTRATION; INTRACAUDAL; PERINEURAL at 08:11:00

## 2024-04-30 RX ADMIN — SODIUM CHLORIDE, SODIUM LACTATE, POTASSIUM CHLORIDE, CALCIUM CHLORIDE: 600; 310; 30; 20 INJECTION, SOLUTION INTRAVENOUS at 09:10:00

## 2024-04-30 NOTE — ANESTHESIA POSTPROCEDURE EVALUATION
Patient: Kwabena Spann    Procedure Summary       Date: 04/30/24 Room / Location: [facility] ENDOSCOPY 04 / [facility] ENDOSCOPY    Anesthesia Start: 0809 Anesthesia Stop: 0911    Procedures:       COLONOSCOPY      ESOPHAGOGASTRODUODENOSCOPY (EGD) Diagnosis:       Weight loss      Appetite loss      Hx of colonic polyp      Abdominal pain, unspecified abdominal location      Irregular bowel habits      Abnormal CT scan      (diverticulosis; colon polyps; gastritis; esophageal nodule)    Surgeons: Wali Chung MD Anesthesiologist: Monika Gilman CRNA    Anesthesia Type: general ASA Status: 2            Anesthesia Type: general    Vitals Value Taken Time   /60 04/30/24 0910   Temp 98 °F (36.7 °C) 04/30/24 0910   Pulse 52 04/30/24 0910   Resp 16 04/30/24 0910   SpO2 100 % 04/30/24 0910       [facility] AN Post Evaluation:   Patient Evaluated in PACU  Patient Participation: complete - patient participated  Level of Consciousness: sleepy but conscious  Pain Score: 0  Pain Management: adequate  Airway Patency:patent  Dental exam unchanged from preop  Yes    Nausea/Vomiting: none  Cardiovascular Status: acceptable  Respiratory Status: acceptable  Postoperative Hydration acceptable      Monika Gilman CRNA  4/30/2024 9:11 AM

## 2024-04-30 NOTE — OPERATIVE REPORT
[facility] Endoscopy Report      Date of Procedure:  04/30/24      Preoperative Diagnosis:  1.  Mid abdominal pain  2.  Anorexia and weight loss  3.  Irregular bowel habits  4.  Abnormal CT scan  5.  Personal history of adenomatous colon polyps      Postoperative Diagnosis:  1.  Colon polyps  2.  Sigmoid colon diverticulosis  3.  Gastritis  4.  Subepithelial cervical nodule      Procedure:    Colonoscopy with polypectomy and biopsy  Esophagogastroduodenoscopy with biopsy      Surgeon:  Wali Chung M.D.      Anesthesia:  Monitored anesthesia care  Cecal withdrawal time: 26 minutes  EBL:  Insignificant      Brief History:  This is a 83 year old male who presents for evaluation of a 3-month history of mid abdominal pain (perhaps worse after eating) associated with anorexia and a documented weight loss.  Bowel habits have been irregular (alternating constipation and diarrhea).  No flushing.  The patient had a small bowel obstruction 3 years prior that resolved with management.  Endoscopy at that time revealed lymphocytic colitis which was treated along with adenomatous colon polyps.  A recent CT scan disclosed focal thickening of the terminal ileum with a 1.5 cm right lower quadrant nodule.  Colonoscopy and upper endoscopy are being performed to evaluate.      Technique:  After informed consent, the patient was placed in the left lateral recumbent position.  Digital rectal examination revealed no palpable intraluminal abnormalities.  An Olympus variable stiffness 190 series HD pediatric colonoscope was inserted into the rectum and advanced under direct vision by following the lumen to the terminal ileum.  The colon was examined upon withdrawal in the left lateral recumbent position.    Following colonoscopy, an Olympus adult HD gastroscope was inserted into the hypopharynx and advanced under direct vision into the esophagus, stomach and duodenum.  The endoscope was withdrawn to the stomach  where retroflexion of the angulus, body, cardia and fundus was performed.  The instrument was straightened, insufflated air and fluid were suctioned and the endoscope was withdrawn.  The procedures were well tolerated without immediate complication.      Findings:  The preparation of the colon was reasonably good.  There was some inspissated bile-stained material present that irrigated free.  The terminal ileum was examined for several cm and visually normal.  No nodules were seen.  Biopsies were obtained.  The ileocecal valve was well preserved. The visualized colonic mucosa from the cecum to the anal verge was normal with an intact vascular pattern.  There were #2 polyps seen within the colon which removed as follows:    1.  In the cecum there was a 6 mm sessile polyp which was cold snare excised and retrieved.  2.  In the mid transverse colon there was a 6-7 mm sessile polyp which was cold snare excised and retrieved.    Inspection of both sites revealed no evidence of ongoing bleeding.  Multiple diverticula were seen in the sigmoid colon without current signs of complication.  There were no other colonic polyps, mass lesions, vascular anomalies or signs of inflammation seen.  Random biopsies were taken upon colonic withdrawal.  Retroflexion in the rectum revealed no abnormalities.    There was an approximately 5-6 mm subepithelial mobile, soft cervical esophageal nodule which was biopsied.  The remainder of the esophagus was normal.  The GE junction and diaphragmatic impression were at 42 cm.  There was soft inflammatory nodularity immediately distal to the junction which was biopsied.  The stomach distended appropriately with insufflated air.  There was no retained food material within the stomach.  The mucosa of the proximal stomach including cardia, fundus and gastric body was otherwise normal.  There was erythematous somewhat polypoid and ulcerated nodularity in the prepyloric antrum.  This did not obstruct  the pylorus.  Multiple biopsies were obtained.  The duodenal bulb and post bulbar regions were normal.  Biopsies were obtained.      Impression:  1.  Colon polyps  2.  Uncomplicated sigmoid colon diverticulosis  3.  Antral and cardial gastritis as described above.  The antral gastritis is of uncertain behavior.  4.  Subepithelial cervical esophageal nodule benign in appearance    Recommendations:  1.  Follow-up biopsy results.  2.  Further recommendations pending biopsy and clinical course.  3.  Resume clopidogrel tomorrow.  4.  PPI acid suppression.  5.  Review CT scan with radiology regarding the small bowel and mesenteric abnormalities.  6.  If gastric biopsies and CT review are benign, follow-up endoscopy in 8-12 weeks would be advised to confirm healing/improvement of the antral abnormalities.        Wali Chung MD  4/30/2024

## 2024-04-30 NOTE — ANESTHESIA PREPROCEDURE EVALUATION
Anesthesia PreOp Note    HPI:     Kwabena Spann is a 83 year old male who presents for preoperative consultation requested by: Wali Chung MD    Date of Surgery: 4/30/2024    Procedure(s):  COLONOSCOPY  ESOPHAGOGASTRODUODENOSCOPY (EGD)  Indication: Weight loss / Appetite loss / Hx of colonic polyp/ Abdominal pain, unspecified abdominal location/ Irregular bowel habits/ Abnormal CT scan    Relevant Problems   No relevant active problems       NPO:  Last Liquid Consumption Date: 04/29/24  Last Liquid Consumption Time: 2000  Last Solid Consumption Date: 04/29/24  Last Solid Consumption Time: 1200  Last Liquid Consumption Date: 04/29/24          History Review:  Patient Active Problem List    Diagnosis Date Noted    Nonrheumatic mitral valve regurgitation 02/05/2023    Stage 3 chronic kidney disease (HCC) 12/30/2021    Primary hypertension 12/30/2021    Leg cramps 10/04/2021    Dysequilibrium 10/04/2021    Abdominal aortic aneurysm (AAA) without rupture (HCC) 10/17/2014    Peripheral vascular disease (HCC) 04/02/2013    Tobacco use disorder 04/02/2013    Hyperlipidemia 03/28/2013       Past Medical History:    High blood pressure    High cholesterol    History of repair of aneurysm of abdominal aorta using endovascular stent graft    Hyperlipidemia    Renal disorder    SBO (small bowel obstruction) ([facility])       Past Surgical History:   Procedure Laterality Date    Cyst removal      Endovascular repair, infrarenal abdominal aortic aneury      endovascular stent graft    Hernia surgery         Medications Prior to Admission   Medication Sig Dispense Refill Last Dose    losartan 50 MG Oral Tab Take 1 tablet (50 mg total) by mouth daily. 90 tablet 0 4/29/2024    simvastatin 40 MG Oral Tab Take 1 tablet (40 mg total) by mouth nightly. 90 tablet 0 4/29/2024    metoprolol succinate ER 25 MG Oral Tablet 24 Hr Take 1 tablet (25 mg total) by mouth daily.   4/30/2024    clopidogrel 75 MG Oral Tab Take 1 tablet (75  mg total) by mouth daily.   4/25/2024    polyethylene glycol, PEG 3350-KCl-NaBcb-NaCl-NaSulf, 236 g Oral Recon Soln Take 4,000 mL by mouth As Directed. Take 2,000 mL the night before your procedure and 2,000 mL the morning of your procedure. Take as directed by GI clinic. Okay to substitute for generic. 1 each 0      Current Facility-Administered Medications Ordered in Epic   Medication Dose Route Frequency Provider Last Rate Last Admin    lactated ringers infusion   Intravenous Continuous Wali Chung MD 20 mL/hr at 04/30/24 0744 New Bag at 04/30/24 0744     No current [facility]-ordered outpatient medications on file.       No Known Allergies    History reviewed. No pertinent family history.  Social History     Socioeconomic History    Marital status:    Tobacco Use    Smoking status: Former     Types: Cigarettes     Start date: 1/16/2024    Smokeless tobacco: Never    Tobacco comments:     4-6    Vaping Use    Vaping status: Never Used   Substance and Sexual Activity    Alcohol use: Yes     Comment: 1 shot a day    Drug use: Never   Other Topics Concern    Weight Concern No       Available pre-op labs reviewed.  Lab Results   Component Value Date    WBC 7.7 02/06/2024    RBC 3.89 02/06/2024    HGB 12.2 (L) 02/06/2024    HCT 36.7 (L) 02/06/2024    MCV 94.3 02/06/2024    MCH 31.4 02/06/2024    MCHC 33.2 02/06/2024    RDW 13.3 02/06/2024    .0 02/06/2024     Lab Results   Component Value Date     02/06/2024    K 4.0 02/06/2024     02/06/2024    CO2 25.0 02/06/2024    BUN 18 02/06/2024    CREATSERUM 1.39 (H) 02/06/2024     (H) 02/06/2024    CA 9.3 02/06/2024          Vital Signs:  Body mass index is 21.02 kg/m².   height is 1.829 m (6') and weight is 70.3 kg (155 lb). His blood pressure is 118/72 and his pulse is 61. His respiration is 13 and oxygen saturation is 100%.   Vitals:    04/25/24 0845 04/30/24 0740   BP:  118/72   Pulse:  61   Resp:  13   SpO2:  100%   Weight: 70.3 kg  (155 lb)    Height: 1.829 m (6')         Anesthesia Evaluation     Patient summary reviewed and Nursing notes reviewed    No history of anesthetic complications   Airway   Mallampati: II  TM distance: >3 FB  Neck ROM: full  Dental    (+) upper dentures and lower dentures    Pulmonary - negative ROS and normal exam   Cardiovascular - normal exam  Exercise tolerance: good  (+) hypertension well controlled    ROS comment: H/O AAA s/p repair with graft    Neuro/Psych - negative ROS     GI/Hepatic/Renal    (+) bowel prep    Endo/Other - negative ROS   Abdominal  - normal exam                 Anesthesia Plan:   ASA:  2  Plan:   General  Informed Consent Plan and Risks Discussed With:  Patient  Discussed plan with:  CRNA and surgeon      I have informed Kwabena Spann and/or legal guardian or family member of the nature of the anesthetic plan, benefits, risks including possible dental damage if relevant, major complications, and any alternative forms of anesthetic management.   All of the patient's questions were answered to the best of my ability. The patient desires the anesthetic management as planned.  Monika Gilman CRNA  4/30/2024 7:57 AM  Present on Admission:  **None**

## 2024-04-30 NOTE — DISCHARGE INSTRUCTIONS
Resume PLAVIX tomorrow: May 1      Home Care Instructions for Colonoscopy and/or Gastroscopy with Sedation    Diet:  - Resume your regular diet as tolerated unless otherwise instructed.  - Start with light meals to minimize bloating.  - Do not drink alcohol today.    Medication:  - If you have questions about resuming your normal medications, please contact your Primary Care Physician.    Activities:  - Take it easy today. Do not return to work today.  - Do not drive today.  - Do not operate any machinery today (including kitchen equipment).    Colonoscopy:  - You may notice some rectal \"spotting\" (a little blood on the toilet tissue) for a day or two after the exam. This is normal.  - If you experience any rectal bleeding (not spotting), persistent tenderness or sharp severe abdominal pains, oral temperature over 100 degrees Fahrenheit, light-headedness or dizziness, or any other problems, contact your doctor.    Gastroscopy:  - You may have a sore throat for 2-3 days following the exam. This is normal. Gargling with warm salt water (1/2 tsp salt to 1 glass warm water) or using throat lozenges will help.  - If you experience any sharp pain in your neck, abdomen or chest, vomiting of blood, oral temperature over 100 degrees Fahrenheit, light-headedness or dizziness, or any other problems, contact your doctor.    **If unable to reach your doctor, please go to the [facility] Emergency Room**    - Your referring physician will receive a full report of your examination.  - If you do not hear from your doctor's office within two weeks of your biopsy, please call them for your results.    You may be able to see your laboratory results in [employer] between 4 and 7 business days.  In some cases, your physician may not have viewed the results before they are released to [employer].  If you have questions regarding your results contact the physician who ordered the test/exam by phone or via [employer] by choosing \"Ask a Medical  Question.\"

## 2024-04-30 NOTE — H&P
History & Physical Examination    Patient Name: Kwabena Spann  MRN: [id number]  CSN: [id number]  YOB: 1940    Diagnosis: Mid abdominal pain, anorexia, weight loss, personal history of colon polyps, irregular bowel habits, abnormal CT scan      Medications Prior to Admission   Medication Sig Dispense Refill Last Dose    losartan 50 MG Oral Tab Take 1 tablet (50 mg total) by mouth daily. 90 tablet 0 4/29/2024    simvastatin 40 MG Oral Tab Take 1 tablet (40 mg total) by mouth nightly. 90 tablet 0 4/29/2024    metoprolol succinate ER 25 MG Oral Tablet 24 Hr Take 1 tablet (25 mg total) by mouth daily.   4/30/2024    clopidogrel 75 MG Oral Tab Take 1 tablet (75 mg total) by mouth daily.   4/25/2024    polyethylene glycol, PEG 3350-KCl-NaBcb-NaCl-NaSulf, 236 g Oral Recon Soln Take 4,000 mL by mouth As Directed. Take 2,000 mL the night before your procedure and 2,000 mL the morning of your procedure. Take as directed by GI clinic. Okay to substitute for generic. 1 each 0      Current Facility-Administered Medications   Medication Dose Route Frequency    lactated ringers infusion   Intravenous Continuous       Allergies: No Known Allergies    Past Medical History:    High blood pressure    High cholesterol    History of repair of aneurysm of abdominal aorta using endovascular stent graft    Hyperlipidemia    Renal disorder    SBO (small bowel obstruction) (HCC)     Past Surgical History:   Procedure Laterality Date    Cyst removal      Endovascular repair, infrarenal abdominal aortic aneury      endovascular stent graft    Hernia surgery       History reviewed. No pertinent family history.  Social History     Tobacco Use    Smoking status: Former     Types: Cigarettes     Start date: 1/16/2024    Smokeless tobacco: Never    Tobacco comments:     4-6    Substance Use Topics    Alcohol use: Yes     Comment: 1 shot a day       SYSTEM Check if Review is Normal Check if Physical Exam is Normal If not normal,  please explain:   HEENT [X ] [ X]    NECK  [X ] [ X]    HEART [X ] [ X]    LUNGS [X ] [ X]    ABDOMEN [X ] [ X]    EXTREMITIES [X ] [ X]    OTHER        [ x ] I have discussed the risks and benefits and alternatives with the patient/family.  They understand and agree to proceed with plan of care.  [ x ] I have reviewed the History and Physical done within the last 30 days.  Any changes noted above.    Wali Chung MD  4/30/2024  8:07 AM

## 2024-05-11 DIAGNOSIS — R63.4 WEIGHT LOSS: ICD-10-CM

## 2024-05-11 DIAGNOSIS — R93.5 ABNORMAL CT OF THE ABDOMEN: Primary | ICD-10-CM

## 2024-05-15 ENCOUNTER — TELEPHONE (OUTPATIENT)
Facility: CLINIC | Age: 84
End: 2024-05-15

## 2024-05-15 NOTE — TELEPHONE ENCOUNTER
----- Message from Wali Bradfordjj sent at 5/11/2024  1:30 PM CDT -----  I left another message on the patient's voicemail that I was calling with favorable test results.    GI RNs: I have been unable to reach the patient.  Please contact him.  He had small benign precancerous polyps removed.  Biopsies of the colon revealed microscopic colitis which he has had before.  In the absence of significant diarrhea I would not recommend specific treatment.  The remainder of the biopsies were unremarkable.  They do not explain the patient's weight loss.  I would recommend a follow-up CT scan based on his symptoms and the previous CT findings.  This will be ordered as a CT enterography.  I would be happy to speak to the patient as well or see him in the office if he prefers to further discuss.  He may see Clarita as well.

## 2024-05-20 NOTE — TELEPHONE ENCOUNTER
Patient contacted, verified and message from Dr. Chung given.  Number for Central Scheduling given.  Patient voiced understanding.

## 2024-05-30 ENCOUNTER — OFFICE VISIT (OUTPATIENT)
Facility: CLINIC | Age: 84
End: 2024-05-30

## 2024-05-30 VITALS
HEIGHT: 72 IN | BODY MASS INDEX: 21.13 KG/M2 | SYSTOLIC BLOOD PRESSURE: 83 MMHG | HEART RATE: 69 BPM | DIASTOLIC BLOOD PRESSURE: 42 MMHG | WEIGHT: 156 LBS

## 2024-05-30 DIAGNOSIS — K52.838 OTHER MICROSCOPIC COLITIS: Primary | ICD-10-CM

## 2024-05-30 PROCEDURE — 99214 OFFICE O/P EST MOD 30 MIN: CPT

## 2024-05-30 RX ORDER — BUDESONIDE 3 MG/1
CAPSULE, COATED PELLETS ORAL
Qty: 84 CAPSULE | Refills: 0 | Status: SHIPPED | OUTPATIENT
Start: 2024-05-30 | End: 2024-07-11

## 2024-05-30 NOTE — PATIENT INSTRUCTIONS
For alternating stool (constipation & diarrhea)    Start metamucil (psyllium husk fiber) 1-2x daily     Then take miralax also (osmotic laxative) if constipation, hard stools, straining     Only take dulcolax (stimulant laxative) if constipation is severe, no bowel movement 4-5 days    Only take anti-diarrhea medication, like imodium, if watery diarrhea is severe    For microscopic colitis (which may be the cause of the diarrhea):  -start taking budesonide per instructions    For abdominal pain    Can continue lavon seltzer as needed  Tums are good if you have heartburn      -follow up in 3 months    -check your blood pressure at home and hold your blood pressure medication if still low and follow up with your PCP. Can follow up on your left leg wound and right ankle swelling as well

## 2024-05-30 NOTE — PROGRESS NOTES
[facility] - Gastroenterology                                                                                                               Reason for consult: abd pain    Requesting physician or provider: Josef Boyce MD    Chief Complaint   Patient presents with    Follow - Up       HPI:   Kwabena Spann is a 83 year old year-old male with active diagnoses including of AAA s/p EVAR ( on Plavix), anemia, HTN, HLD, PVD, CKD. Prior medical/surgical history includes SBO in 2021 in table below.    Today  -since prior visit symptoms are the same, still having periumbilical pain and alternating constipation & diarrhea. Weight is stable from 1 month ago.   -s/p EGD/CLN in April. Finding of microscopic colitis. No explanation of weight loss. CT enterography ordered by Dr. Chung, scheduled for 6/4  -has alternating constipation & diarrhea. He is taking miralax when needed.      Prior visit 4/17/24  he is here today for evaluation  #abdominal pain  #irregular bowel habits  #weight loss  -he reports daily periumbilical abdominal pain x 3 months that lasts 1-3 hours at a time. He will try warm milk, alk seltzer for the pain which sometimes help. Also feels full with small amounts of food. Reports lower appetite and about 10lb weight loss the past 2 months  -the past few months he has had constipation with bowel movement every 2-3 days with intermittent diarrhea. Occasionally will have a soft formed stool. Baseline bowel habits previously were bowel movements every other day with occasional constipation or diarrhea  -2/6/24 labs: mild normocytic anemia, hgb 12.2    Last saw GI at [place] in 2021:  1. SBO: resolved, stable with symptoms. CT abd with mesenteric infiltration  Consider CT enterography, f/u in 3 months    2 . 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 Normocytic anemia: unclear etiology , normal iron and ferritin     4. Post-prandial bloating: resolving with diet and lifestyle modification. Tums as needed  - Small frequent meals  - Low fat diet     5. Colon cancer screening/surveillance: patient is average risk for CRC, > 3 adenomas were resected during his last colonoscopy in 09/20  - Surveillance colonoscopy in 09/2023     Patient denies symptoms of nausea, vomiting, dyspepsia, dysphagia, odynophagia, globus sensation, heartburn, hematemesis, hematochezia, or melena. he denies recent fever.    Last colonoscopy:   4/30/2024 Dr. Chung  Colon polyps, benign, precancerous  Uncomplicated sigmoid colon diverticulosis    Biopsy +microscopic colitis (untreated b/c pt asymptomatic)      2020 @ [facility] - 3 year recall  -4 tubular adenomas  -lymphocytic colitis  -diverticulosis  -internal & external hemorrhoids    Last EGD:   4/30/2024 Dr. Chung  -Antral and cardial gastritis as described above.  The antral gastritis is of uncertain behavior.  -Subepithelial cervical esophageal nodule benign in appearance    2020 @ [facility]  - Normal esophagus.  - Z-line regular, 45 cm from the incisors.  - Gastritis.  - Normal examined duodenum. Biopsied.  - Biopsies were taken in the stomach with a cold forceps for histology and  Helicobacter pylori testing.     FINAL DIAGNOSIS   A.  SMALL BOWEL; BIOPSY:   -DUODENAL MUCOSA WITHIN NORMAL LIMITS   -PRESERVED VILLOUS ARCHITECTURE AND NO INCREASE IN INTRAEPITHELIAL LYMPHOCYTES     B.  GASTRIC; BIOPSY:   -ANTRAL AND OXYNTIC TYPE MUCOSA WITH MILD CHRONIC INACTIVE GASTRITIS   -NEGATIVE FOR H. PYLORI     NSAIDS:none   Tobacco: former  Alcohol:  Marijuana: none   Illicit drugs: none     FH GI malignancy: none   FH IBD: none     No history of adverse reaction to sedation  No RIP  YES anticoagulants/antiplatelet - clopidogrel (Plavix)   No pacemaker/defibrillator    Wt Readings from Last 6 Encounters:   05/30/24 156 lb  (70.8 kg)   04/25/24 155 lb (70.3 kg)   04/17/24 155 lb (70.3 kg)   02/29/24 168 lb (76.2 kg)   02/06/24 167 lb (75.8 kg)   10/06/23 183 lb (83 kg)        History, Medications, Allergies, ROS:      Past Medical History:    High blood pressure    High cholesterol    History of repair of aneurysm of abdominal aorta using endovascular stent graft    Hyperlipidemia    Renal disorder    SBO (small bowel obstruction) (HCC)      Past Surgical History:   Procedure Laterality Date    Colonoscopy N/A 4/30/2024    Procedure: COLONOSCOPY;  Surgeon: Wali Chung MD;  Location: [facility] ENDOSCOPY    Cyst removal      Endovascular repair, infrarenal abdominal aortic aneury      endovascular stent graft    Hernia surgery        Family Hx: No family history on file.   Social History:   Social History     Socioeconomic History    Marital status:    Tobacco Use    Smoking status: Former     Types: Cigarettes     Start date: 1/16/2024    Smokeless tobacco: Never    Tobacco comments:     4-6    Vaping Use    Vaping status: Never Used   Substance and Sexual Activity    Alcohol use: Yes     Comment: 1 shot a day    Drug use: Never   Other Topics Concern    Weight Concern No        Medications (Active prior to today's visit):  Current Outpatient Medications   Medication Sig Dispense Refill    budesonide 3 MG Oral Cap DR Particles Take 3 capsules (9 mg total) by mouth every morning for 14 days, THEN 2 capsules (6 mg total) every morning for 14 days, THEN 1 capsule (3 mg total) every morning for 14 days. 84 capsule 0    simvastatin 40 MG Oral Tab Take 1 tablet (40 mg total) by mouth nightly. 90 tablet 0    losartan 50 MG Oral Tab Take 1 tablet (50 mg total) by mouth daily. 90 tablet 0    metoprolol succinate ER 25 MG Oral Tablet 24 Hr Take 1 tablet (25 mg total) by mouth daily.      clopidogrel 75 MG Oral Tab Take 1 tablet (75 mg total) by mouth daily.         Allergies:  No Known Allergies    ROS:   CONSTITUTIONAL: negative for  fevers, chills, sweats  EYES Negative for scleral icterus or redness, and diplopia  HEENT: Negative for hoarseness  RESPIRATORY: Negative for cough and severe shortness of breath  CARDIOVASCULAR: Negative for crushing sub-sternal chest pain  GASTROINTESTINAL: See HPI  GENITOURINARY: Negative for dysuria  MUSCULOSKELETAL: Negative for arthralgias and myalgias  SKIN: Negative for jaundice, rash or pruritus  NEUROLOGICAL: Negative for dizziness and headaches  BEHAVIOR/PSYCH: Negative for psychotic behavior    PHYSICAL EXAM:   Blood pressure (!) 83/42, pulse 69, height 6' (1.829 m), weight 156 lb (70.8 kg).    GEN: Alert, no acute distress, well-nourished   HEENT: anicteric sclera, neck supple, trachea midline, MMM, no palpable or tender neck or supraclavicular lymph nodes  CV: RRR, the extremities are warm and well perfused   LUNGS: No increased work of breathing, CTAB  ABDOMEN: Soft, symmetrical, non-tender without distention or guarding. No scars or lesions. Aorta is without bruit or visible pulsation. Umbilicus is midline without herniation. Normoactive bowel sounds are present, No masses, hepatomegaly or splenomegaly noted.  MSK: No erythema, no warmth, no swelling of joints  SKIN: No jaundice, no erythema, no lesions. Superficial red discoloration to left shin  HEMATOLOGIC: No bleeding, no bruising  NEURO: Alert and interactive, JOHNS  PSYCH: appropriate mood & affect    Labs/Imaging/Procedures:     Patient's pertinent labs and imaging were reviewed and discussed with patient today.        .  ASSESSMENT/PLAN:   Kwabena Spann is a 83 year old year-old male with active diagnoses including of AAA s/p EVAR ( on Plavix), anemia, HTN, HLD, PVD, CKD. Prior medical/surgical history includes SBO in 2021 in table below.    Today  -since prior visit symptoms are the same, still having periumbilical pain and alternating constipation & diarrhea. Weight is stable from 1 month ago.   -s/p EGD/CLN in April. Finding of  microscopic colitis. No explanation of weight loss. CT enterography ordered by Dr. Chung, scheduled for 6/4  -has alternating constipation & diarrhea. He is taking miralax when needed.    -the patient endorses he has a poor memory. He is somewhat poor historian regarding bowel habits and other GI symptoms. Will plan to treat microscopic colitis at this time given ongoing diarrhea. Follow up in a few months to gauge improvement. Abdominal pain may be related to chronic constipation.     -Blood pressure low initially 80/40s, rechecked on bilateral arms at end up visit and 140s/90s. Possibly false reading. Pt denies weakness, dizziness. Reports he ate toast with peanut butter and had coffee today. No water today. Advised to check at home and follow up with PCP if still low    Recommendations:    For alternating stool (constipation & diarrhea)    Start metamucil (psyllium husk fiber) 1-2x daily     Then take miralax also (osmotic laxative) if constipation, hard stools, straining     Only take dulcolax (stimulant laxative) if constipation is severe, no bowel movement 4-5 days    Only take anti-diarrhea medication, like imodium, if watery diarrhea is severe    For microscopic colitis (which may be the cause of the diarrhea):  -start taking budesonide per instructions    For abdominal pain    Can continue lavon seltzer as needed  Tums are good if you have heartburn    -follow up in 3 months      Orders This Visit:  No orders of the defined types were placed in this encounter.      Meds This Visit:  Requested Prescriptions     Signed Prescriptions Disp Refills    budesonide 3 MG Oral Cap DR Particles 84 capsule 0     Sig: Take 3 capsules (9 mg total) by mouth every morning for 14 days, THEN 2 capsules (6 mg total) every morning for 14 days, THEN 1 capsule (3 mg total) every morning for 14 days.       Imaging & Referrals:  None      ABBI Gross    [facility] Gastroenterology  5/30/2024        This note was  partially prepared using Dragon Medical voice recognition dictation software. As a result, errors may occur. When identified, these errors have been corrected. While every attempt is made to correct errors during dictation, discrepancies may still exist.

## 2024-06-04 ENCOUNTER — HOSPITAL ENCOUNTER (OUTPATIENT)
Dept: CT IMAGING | Facility: HOSPITAL | Age: 84
Discharge: HOME | End: 2024-06-04
Attending: INTERNAL MEDICINE
Payer: MEDICARE

## 2024-06-04 DIAGNOSIS — R63.4 WEIGHT LOSS: ICD-10-CM

## 2024-06-04 DIAGNOSIS — R93.5 ABNORMAL CT OF THE ABDOMEN: ICD-10-CM

## 2024-06-04 LAB
CREAT BLD-MCNC: 1.3 MG/DL
EGFRCR SERPLBLD CKD-EPI 2021: 55 ML/MIN/1.73M2 (ref 60–?)

## 2024-06-04 PROCEDURE — 82565 ASSAY OF CREATININE: CPT

## 2024-06-04 PROCEDURE — 74177 CT ABD & PELVIS W/CONTRAST: CPT | Performed by: INTERNAL MEDICINE

## 2024-06-07 ENCOUNTER — TELEPHONE (OUTPATIENT)
Facility: CLINIC | Age: 84
End: 2024-06-07

## 2024-06-07 NOTE — TELEPHONE ENCOUNTER
I spoke to the patient earlier today at 3 PM.  He feels that he is mildly better on budesonide with an improved appetite, however, continues to have postprandial abdominal pain with a documented weight loss that is currently stabilizing.  We discussed the CT enterography findings.  There is a persistently abnormal small bowel loop.  Differential diagnosis includes a carcinoid tumor, lymphoma, Crohn's disease (less likely) or an ischemic segment.  I discussed diagnostic options which could include a diagnostic and potentially therapeutic laparoscopy, retrograde double-balloon enteroscopy (which would only be helpful if Crohn's disease were diagnosed leading to medical therapy).  I have suggested a consultation with Dr. Horn for his opinion.  He is agreeable.    Shalom, as above.  Can you have your office staff contact him on Monday to arrange an office consultation.  Thank you!

## 2024-06-07 NOTE — TELEPHONE ENCOUNTER
Patient calling regards results from CT scan, states received a call. No telephone encounter found. Please call.

## 2024-06-07 NOTE — TELEPHONE ENCOUNTER
Dr Keyes    Called and spoke to the patient, date of birth and anem verified.    He will stay at home today and will wait for your call.    He prefers if you call him with his land line number [phone] to discuss CT results.    Thank you

## 2024-06-18 ENCOUNTER — LAB ENCOUNTER (OUTPATIENT)
Dept: LAB | Facility: HOSPITAL | Age: 84
End: 2024-06-18
Attending: SURGERY

## 2024-06-18 ENCOUNTER — OFFICE VISIT (OUTPATIENT)
Dept: SURGERY | Facility: CLINIC | Age: 84
End: 2024-06-18

## 2024-06-18 VITALS
DIASTOLIC BLOOD PRESSURE: 74 MMHG | TEMPERATURE: 98 F | HEART RATE: 63 BPM | BODY MASS INDEX: 20 KG/M2 | WEIGHT: 150 LBS | RESPIRATION RATE: 20 BRPM | SYSTOLIC BLOOD PRESSURE: 140 MMHG

## 2024-06-18 DIAGNOSIS — R93.5 ABNORMAL CT OF THE ABDOMEN: Primary | ICD-10-CM

## 2024-06-18 DIAGNOSIS — D12.6 TUBULAR ADENOMA OF COLON: ICD-10-CM

## 2024-06-18 LAB
ALBUMIN SERPL-MCNC: 4.2 G/DL (ref 3.2–4.8)
ALBUMIN/GLOB SERPL: 1.8 {RATIO} (ref 1–2)
ALP LIVER SERPL-CCNC: 90 U/L
ALT SERPL-CCNC: <7 U/L
ANION GAP SERPL CALC-SCNC: 4 MMOL/L (ref 0–18)
AST SERPL-CCNC: 16 U/L (ref ?–34)
BILIRUB SERPL-MCNC: 0.5 MG/DL (ref 0.2–1.1)
BUN BLD-MCNC: 20 MG/DL (ref 9–23)
BUN/CREAT SERPL: 17.4 (ref 10–20)
CALCIUM BLD-MCNC: 9.1 MG/DL (ref 8.7–10.4)
CHLORIDE SERPL-SCNC: 110 MMOL/L (ref 98–112)
CO2 SERPL-SCNC: 27 MMOL/L (ref 21–32)
CREAT BLD-MCNC: 1.15 MG/DL
EGFRCR SERPLBLD CKD-EPI 2021: 64 ML/MIN/1.73M2 (ref 60–?)
FASTING STATUS PATIENT QL REPORTED: NO
GLOBULIN PLAS-MCNC: 2.3 G/DL (ref 2–3.5)
GLUCOSE BLD-MCNC: 101 MG/DL (ref 70–99)
OSMOLALITY SERPL CALC.SUM OF ELEC: 295 MOSM/KG (ref 275–295)
POTASSIUM SERPL-SCNC: 4.6 MMOL/L (ref 3.5–5.1)
PROT SERPL-MCNC: 6.5 G/DL (ref 5.7–8.2)
SODIUM SERPL-SCNC: 141 MMOL/L (ref 136–145)

## 2024-06-18 PROCEDURE — 99205 OFFICE O/P NEW HI 60 MIN: CPT | Performed by: SURGERY

## 2024-06-18 PROCEDURE — 86316 IMMUNOASSAY TUMOR OTHER: CPT

## 2024-06-18 PROCEDURE — 36415 COLL VENOUS BLD VENIPUNCTURE: CPT

## 2024-06-18 PROCEDURE — 80053 COMPREHEN METABOLIC PANEL: CPT

## 2024-06-18 RX ORDER — NEOMYCIN SULFATE 500 MG/1
TABLET ORAL
Qty: 6 TABLET | Refills: 0 | Status: SHIPPED | OUTPATIENT
Start: 2024-06-18

## 2024-06-18 RX ORDER — METRONIDAZOLE 500 MG/1
TABLET ORAL
Qty: 3 TABLET | Refills: 0 | Status: SHIPPED | OUTPATIENT
Start: 2024-06-18

## 2024-06-18 RX ORDER — POLYETHYLENE GLYCOL-3350 AND ELECTROLYTES 236; 6.74; 5.86; 2.97; 22.74 G/274.31G; G/274.31G; G/274.31G; G/274.31G; G/274.31G
4000 POWDER, FOR SOLUTION ORAL ONCE
Qty: 4000 ML | Refills: 0 | Status: SHIPPED | OUTPATIENT
Start: 2024-06-18 | End: 2024-06-18

## 2024-06-18 NOTE — PATIENT INSTRUCTIONS
Surgery: Diagnostic Laparoscopy and possible right hemicolectomy    Date of Surgery: TBD    Surgery Length: 3 hours    Anesthesia: [facility]:    [facility]- [address], [place] [address]   Phone: [phone]    This is an inpatient procedure.  Use the provided Chlorhexadine surgical soap(instructions attached) to shower the night before and morning of your procedure.  Do not apply powders, creams, lotions or deodorant after showering.  Do not apply any kind of makeup and make sure to remove nail polish prior to your surgery.  For faster recovery from anesthesia and surgery please follow the instructions below regarding your pre-op diet:    Bowel Prep diet and instructions for colon surgery:  The day before surgery you may have a small breakfast and then start a clear liquid diet. This diet includes: clear beverages, clear soup or broth, and Jell-O.   A liquid bowel preparation will be called into your pharmacy. You must drink this preparation the day before surgery to clean out your colon. Follow the directions for the prep on the prescription and begin to take the prep between 2pm-3pm the day prior to surgery.  You will also need to take 2 oral antibiotics the day before your surgery to reduce the risk of infection. You will get a prescription for these or they will be sent electronically to your pharmacy. The antibiotics include: Neomycin 1gram and Flagyl 500mg. You will take these as directed at 2pm, 3pm, and 10pm the day before surgery.   12 hours prior to your surgery time you are to drink one 10oz bottle of Ensure Pre-Surgery Drink. You are to have NO solid food or water after 11pm the night before your surgery EXCEPT one additional 10oz bottle of Ensure Pre-Surgery Drink. You need to finish drinking this 4 hours prior to surgery time.  Take Tylenol 1000mg by mouth at the time of your second Ensure Pre-Surgery drink(4hrs prior to surgery time), unless instructed otherwise by  your surgeon.     Bring your picture ID and insurance card with you.  Wear comfortable clothing that can easily be removed. Preferably, something that zips, snaps, or buttons up the front.   You will be contacted by the hospital the day prior to your surgery to confirm details and give you specific instructions about when and where to arrive the day of your procedure.   If you are taking blood thinners including: Plavix, Eliquis, Coumadin you will need to contact the prescribing provider for specific instructions on holding these medications for your procedure  Motrin, Advil, Ibuprofen, Aspirin, Baby Aspirin and Fish Oil are also blood thinners and need to be held at least one week prior to your procedure. It is okay to take Tylenol.  Inform your primary care physician of your surgery and ask if him/her will need to see you prior to surgery.    Pre-Operative Testing  x CBC x CMP  BMP    PT, PTT, INR  UA x EKG    Chest X-Ray x Cardiac Clearance/Anticoagulation recommendations x H & P Medical Clearance       Does patient have pacemaker: [] YES [x] No Does patient have RIP:  [] YES [x] No  Is patient diabetic?  [] YES    [x] NO    Please call PCP/specialty physician to schedule pre-op exam for medical clearance needed 7 days prior to surgery.     Shalom Horn M.D.  Complex General Surgical Oncology  [facility]    Dr. Horn's nurse line:  [phone]  Monday through Friday 8:00am to 4:30pm.     For Dr. Horn's office: [phone]/ Fax: [phone]  After hours you will reach the answering service     Central [phone]  Medical Records:   [phone]

## 2024-06-19 NOTE — CONSULTS
[facility]    Patient Name:  Kwabena Spann   YOB: 1940   Gender:  Male   Appt Date:  6/18/2024   Provider:  Shalom Horn MD   Insurance:  MEDICARE PART B ONLY     PATIENT PROVIDERS  Referring Provider: No ref. provider found   Address: No referring provider defined for this encounter.   Phone #: N/A    Primary Care Provider:Josef Boyce MD   Address: [address] [place] [id number]   Phone #: [phone]       CHIEF COMPLAINT  Chief Complaint   Patient presents with    Consult        PROBLEMS  Reviewed   Patient Active Problem List   Diagnosis    Abdominal aortic aneurysm (AAA) without rupture (HCC)    Hyperlipidemia    Peripheral vascular disease (HCC)    Tobacco use disorder    Leg cramps    Dysequilibrium    Stage 3 chronic kidney disease (HCC)    Primary hypertension    Nonrheumatic mitral valve regurgitation        History of Present Illness:  Kwabena Spann is a 83 year old Male with PMHx AAA s/p repair, HTN, HLD, PAD, CKD who is being referred by Dr. Chung to render an opinion regarding the surgical management of abnormal CT imaging in setting of weight loss and abdominal pain.    Patient presented to GI with abdominal pain for several months and 10 lb weight loss over 2-3 months. He also endorses alternating constipation and diarrhea. CT A/P 3/12 showed mild bowel wall thickening in two section of the distal ileum with a few small soft tissue nodules, with differential including resolving enteritis or carcinoid tumor with adjacent desmoplastic reaction, but no other acute findings to explain abdominal pain. He underwent EGD/CLN showed 2 colon polyps but otherwise normal appearance. 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    Patient presents to discuss surgical options. Prior surgery includes AAA repair and hernia repair. He is on plavix  related to his AAA.     Vital Signs:  /74 (BP Location: Right arm, Patient Position: Sitting, Cuff Size: adult)   Pulse 63   Temp 98.2 °F (36.8 °C) (Temporal)   Resp 20   Wt 68 kg (150 lb)   BMI 20.34 kg/m²      Medications Reviewed:    Current Outpatient Medications:     neomycin 500 MG Oral Tab, Take 2 tablets at 2 pm, 3 pm and 10 pm the day before surgery., Disp: 6 tablet, Rfl: 0    metRONIDAZOLE (FLAGYL) 500 MG Oral Tab, Take 1 tablet at 2 pm, 3 pm and 10 pm the day before surgery., Disp: 3 tablet, Rfl: 0    budesonide 3 MG Oral Cap DR Particles, Take 3 capsules (9 mg total) by mouth every morning for 14 days, THEN 2 capsules (6 mg total) every morning for 14 days, THEN 1 capsule (3 mg total) every morning for 14 days., Disp: 84 capsule, Rfl: 0    simvastatin 40 MG Oral Tab, Take 1 tablet (40 mg total) by mouth nightly., Disp: 90 tablet, Rfl: 0    losartan 50 MG Oral Tab, Take 1 tablet (50 mg total) by mouth daily., Disp: 90 tablet, Rfl: 0    metoprolol succinate ER 25 MG Oral Tablet 24 Hr, Take 1 tablet (25 mg total) by mouth daily., Disp: , Rfl:     clopidogrel 75 MG Oral Tab, Take 1 tablet (75 mg total) by mouth daily., Disp: , Rfl:      Allergies Reviewed:  No Known Allergies     History:  Reviewed:  Past Medical History:    High blood pressure    High cholesterol    History of repair of aneurysm of abdominal aorta using endovascular stent graft    Hyperlipidemia    Renal disorder    SBO (small bowel obstruction) (HCC)      Reviewed:  Past Surgical History:   Procedure Laterality Date    Colonoscopy N/A 4/30/2024    Procedure: COLONOSCOPY;  Surgeon: Wali Chung MD;  Location: [facility] ENDOSCOPY    Cyst removal      Endovascular repair, infrarenal abdominal aortic aneury      endovascular stent graft    Hernia surgery        Reviewed Social History:  Social History     Socioeconomic History    Marital status:    Tobacco Use    Smoking status: Former     Types: Cigarettes     Start date:  1/16/2024    Smokeless tobacco: Never    Tobacco comments:     4-6    Vaping Use    Vaping status: Never Used   Substance and Sexual Activity    Alcohol use: Yes     Comment: 1 shot a day    Drug use: Never   Other Topics Concern    Weight Concern No      Reviewed:  History reviewed. No pertinent family history.     Review of Systems:  Review of Systems   Constitutional:  Positive for appetite change and unexpected weight change. Negative for fatigue.   HENT: Negative.     Eyes: Negative.    Respiratory:  Negative for shortness of breath.    Cardiovascular:  Negative for chest pain.   Gastrointestinal:  Positive for abdominal pain, constipation and diarrhea. Negative for abdominal distention, nausea and vomiting.   Endocrine: Negative.    Genitourinary: Negative.    Musculoskeletal:  Negative for back pain.   Skin:  Negative for rash and wound.   Neurological:  Negative for weakness.   Hematological:  Negative for adenopathy.   Psychiatric/Behavioral: Negative.          Physical Examination:  Physical Exam  Constitutional:       General: He is not in acute distress.     Appearance: He is well-developed.   HENT:      Head: Normocephalic and atraumatic.   Eyes:      General: No scleral icterus.  Pulmonary:      Effort: Pulmonary effort is normal. No respiratory distress.   Abdominal:      General: There is no distension.      Palpations: Abdomen is soft. There is no mass.      Tenderness: There is no abdominal tenderness. There is no guarding or rebound.   Musculoskeletal:         General: Normal range of motion.      Cervical back: Normal range of motion and neck supple.   Skin:     General: Skin is warm and dry.   Neurological:      Mental Status: He is alert and oriented to person, place, and time.          Document Review:  CT A/P - 3/12/24  CONCLUSION:      1. Mild circumferential bowel wall thickening involving two sections the distal ileum with associated prominence of the adjacent vasa recta and a few small  soft tissue nodules with the largest measuring 1.5 cm in the right lower quadrant of the abdomen.    Differential diagnosis for these findings include sequela of prior inflammatory enteritis with adjacent lymph nodes or carcinoid tumor with adjacent desmoplastic reaction.  Comparison to prior imaging studies would be helpful.   2. No bowel obstruction, acute appendicitis, abnormal large bowel wall thickening, or acute diverticulitis.   3. Postprocedural changes from placement of an aorto bi-iliac stent graft across an infrarenal abdominal aortic aneurysm measuring 4.2 cm in diameter.   4. Multiple bilateral renal cysts and hepatic cysts.    5. Bladder diverticuli with the largest posteriorly on the left measuring 5.0 cm.   6. Lesser incidental findings described above.       EGD/CLN - 4/30/2024  Findings:  The preparation of the colon was reasonably good.  There was some inspissated bile-stained material present that irrigated free.  The terminal ileum was examined for several cm and visually normal.  No nodules were seen.  Biopsies were obtained.  The ileocecal valve was well preserved. The visualized colonic mucosa from the cecum to the anal verge was normal with an intact vascular pattern.  There were #2 polyps seen within the colon which removed as follows:     1.  In the cecum there was a 6 mm sessile polyp which was cold snare excised and retrieved.  2.  In the mid transverse colon there was a 6-7 mm sessile polyp which was cold snare excised and retrieved.     Inspection of both sites revealed no evidence of ongoing bleeding.  Multiple diverticula were seen in the sigmoid colon without current signs of complication.  There were no other colonic polyps, mass lesions, vascular anomalies or signs of inflammation seen.  Random biopsies were taken upon colonic withdrawal.  Retroflexion in the rectum revealed no abnormalities.     There was an approximately 5-6 mm subepithelial mobile, soft cervical esophageal  nodule which was biopsied.  The remainder of the esophagus was normal.  The GE junction and diaphragmatic impression were at 42 cm.  There was soft inflammatory nodularity immediately distal to the junction which was biopsied.  The stomach distended appropriately with insufflated air.  There was no retained food material within the stomach.  The mucosa of the proximal stomach including cardia, fundus and gastric body was otherwise normal.  There was erythematous somewhat polypoid and ulcerated nodularity in the prepyloric antrum.  This did not obstruct the pylorus.  Multiple biopsies were obtained.  The duodenal bulb and post bulbar regions were normal.  Biopsies were obtained.        Impression:  1.  Colon polyps  2.  Uncomplicated sigmoid colon diverticulosis  3.  Antral and cardial gastritis as described above.  The antral gastritis is of uncertain behavior.  4.  Subepithelial cervical esophageal nodule benign in appearance     Assessment / Plan:    ICD-10-CM    1. Abnormal CT of the abdomen  R93.5         D12.6 CHROMOGRANIN A [E]     Comp Metabolic Panel (14) [E]     Clip hair of operative site.     neomycin 500 MG Oral Tab     metRONIDAZOLE (FLAGYL) 500 MG Oral Tab     polyethylene glycol, PEG 3350-KCl-NaBcb-NaCl-NaSulf, (GAVILYTE-G) 236 g Oral Recon Soln     Plan to proceed with diagnostic laparoscopy possible small bowel resection/ileocecectomy  Chromogranin A level  Discussed risks of the operation including not limited to bleeding, infection, anastomotic problems  Patient agreeable and wishes to proceed    Shalom Horn MD  Complex General Surgical Oncology  [place], [facility]  [email or web address]

## 2024-06-20 ENCOUNTER — DOCUMENTATION ONLY (OUTPATIENT)
Dept: SURGERY | Facility: CLINIC | Age: 84
End: 2024-06-20

## 2024-06-20 NOTE — PROGRESS NOTES
Date of Surgery: TBD    Diagnosis: Tubular adenoma of colon, D12.6    Procedure: Diagnostic Laparoscopy and possible right hemicolectomy     Location: [x] [place] OR  [] [facility]  [] Endo Lab    Type: [x] Inpatient  [] Outpatient  [] 23-hour observation    Number of days inpatient: 3 days    Length of Surgery: 3 hours    Anesthesia: [] Local  [] MAC [x] General  [] Pec Block     Joint case with: [] Yes,   [x] No   Needs SA:  [x] Yes  [] No    Special Equipment:    Penicillin Allergy: [] Yes [x] No   Nickel Allergy: [] Yes [x] No   Latex Allergy: [] Yes [x] No    Orders:  Colon Bundle/Bowel Prep: [x] Yes  [] No    Type and Cross 2 units PRBC: [x] Yes [] No    Type and Screen: [x] Yes [] No    Advanced Oncology Order Set (HIPEC): [] Yes [x] No    Heparin Pre-op (Heparin 5000 units subQ x 1 dose): [x] Yes  [] No    Nuclear Med Injection: [] Yes  [x] No    Wire localization needed: [] Yes [x] No    Midline placement (HIPEC,Whipple, Esophagectomy, Liver): [] Yes [x] No    CHG Cloths (HIPEC, Whipple, Esophagectomy, Liver): [x] Yes [] No    Tylenol administration prior to surgery: [x] Yes [] No    Does patient have a pacemaker: [] Yes [x] No  Sleep Apnea: [] Yes  [x] No      Is patient diabetic: [] Yes, if so, no Ensure/Gatorade [x] No    Antibiotics: [x] /[facility] prophylactic antibiotic protocol [] No need of antibiotics    PAT Orders: [x]CBC  [x]CMP [x]EKG  []CT Scan []Chest X-ray

## 2024-06-21 LAB — CHROMOGRANIN A: 250.5 NG/ML

## 2024-06-24 ENCOUNTER — TELEPHONE (OUTPATIENT)
Dept: SURGERY | Facility: CLINIC | Age: 84
End: 2024-06-24

## 2024-06-26 ENCOUNTER — TELEPHONE (OUTPATIENT)
Dept: SURGERY | Facility: CLINIC | Age: 84
End: 2024-06-26

## 2024-06-26 NOTE — TELEPHONE ENCOUNTER
CARYLM for patient asking them to please give me a call back regarding scheduling their surgery with Dr. Horn

## 2024-06-28 NOTE — TELEPHONE ENCOUNTER
Spoke to patient and patient's wife regarding surgery dates. Patient agreeable to 7/15 or 7/29 at [facility]. Patient states will call cardiology and PCP to schedule pre-op appointments on 7/1. Advised patient to call back once scheduled to confirm surgery date. All questions answered, advised to call back for any future questions or concerns. Patient verbalized understanding.

## 2024-07-01 DIAGNOSIS — D12.6 TUBULAR ADENOMA OF COLON: Primary | ICD-10-CM

## 2024-07-01 NOTE — TELEPHONE ENCOUNTER
Patient states unable to see cardiologist until 7/16 and PCP pre-op appointment scheduled 7/15. Patient agreeable of surgery Diagnostic Laparoscopy and possible right hemicolectomy scheduled for 7/29/24 at [facility]. Reviewed patient instructions with patient again and patient's wife. Advised patient to call back regarding any questions or concerns. Patient verbalized understanding. Message sent to surgery scheduler to schedule surgery.

## 2024-07-05 RX ORDER — LOSARTAN POTASSIUM 50 MG/1
50 TABLET ORAL DAILY
Qty: 90 TABLET | Refills: 0 | Status: SHIPPED | OUTPATIENT
Start: 2024-07-05

## 2024-07-17 ENCOUNTER — OFFICE VISIT (OUTPATIENT)
Dept: INTERNAL MEDICINE CLINIC | Facility: CLINIC | Age: 84
End: 2024-07-17
Payer: MEDICARE

## 2024-07-17 ENCOUNTER — LAB ENCOUNTER (OUTPATIENT)
Dept: LAB | Facility: REFERENCE LAB | Age: 84
End: 2024-07-17
Attending: INTERNAL MEDICINE
Payer: MEDICARE

## 2024-07-17 VITALS
WEIGHT: 152 LBS | BODY MASS INDEX: 20.59 KG/M2 | HEIGHT: 72 IN | DIASTOLIC BLOOD PRESSURE: 60 MMHG | OXYGEN SATURATION: 99 % | HEART RATE: 71 BPM | SYSTOLIC BLOOD PRESSURE: 118 MMHG

## 2024-07-17 DIAGNOSIS — I71.43 INFRARENAL ABDOMINAL AORTIC ANEURYSM (AAA) WITHOUT RUPTURE (HCC): ICD-10-CM

## 2024-07-17 DIAGNOSIS — Z01.818 PREOP EXAMINATION: Primary | ICD-10-CM

## 2024-07-17 DIAGNOSIS — N18.31 STAGE 3A CHRONIC KIDNEY DISEASE (HCC): ICD-10-CM

## 2024-07-17 DIAGNOSIS — Z01.818 PREOP EXAMINATION: ICD-10-CM

## 2024-07-17 DIAGNOSIS — K63.9 SMALL BOWEL LESION: ICD-10-CM

## 2024-07-17 DIAGNOSIS — R63.4 WEIGHT LOSS, NON-INTENTIONAL: ICD-10-CM

## 2024-07-17 DIAGNOSIS — I10 PRIMARY HYPERTENSION: ICD-10-CM

## 2024-07-17 DIAGNOSIS — Z01.818 PRE-OP TESTING: ICD-10-CM

## 2024-07-17 DIAGNOSIS — I47.10 PSVT (PAROXYSMAL SUPRAVENTRICULAR TACHYCARDIA) (HCC): ICD-10-CM

## 2024-07-17 DIAGNOSIS — I34.0 NONRHEUMATIC MITRAL VALVE REGURGITATION: ICD-10-CM

## 2024-07-17 LAB
ALBUMIN SERPL-MCNC: 3.9 G/DL (ref 3.2–4.8)
ALBUMIN/GLOB SERPL: 1.7 {RATIO} (ref 1–2)
ALP LIVER SERPL-CCNC: 87 U/L
ALT SERPL-CCNC: 7 U/L
ANION GAP SERPL CALC-SCNC: 6 MMOL/L (ref 0–18)
ANTIBODY SCREEN: NEGATIVE
AST SERPL-CCNC: 14 U/L (ref ?–34)
BASOPHILS # BLD AUTO: 0.03 X10(3) UL (ref 0–0.2)
BASOPHILS NFR BLD AUTO: 0.5 %
BILIRUB SERPL-MCNC: 0.4 MG/DL (ref 0.2–1.1)
BUN BLD-MCNC: 22 MG/DL (ref 9–23)
BUN/CREAT SERPL: 19 (ref 10–20)
CALCIUM BLD-MCNC: 9 MG/DL (ref 8.7–10.4)
CHLORIDE SERPL-SCNC: 111 MMOL/L (ref 98–112)
CO2 SERPL-SCNC: 26 MMOL/L (ref 21–32)
CREAT BLD-MCNC: 1.16 MG/DL
DEPRECATED RDW RBC AUTO: 47.8 FL (ref 35.1–46.3)
EGFRCR SERPLBLD CKD-EPI 2021: 63 ML/MIN/1.73M2 (ref 60–?)
EOSINOPHIL # BLD AUTO: 0.08 X10(3) UL (ref 0–0.7)
EOSINOPHIL NFR BLD AUTO: 1.3 %
ERYTHROCYTE [DISTWIDTH] IN BLOOD BY AUTOMATED COUNT: 13.2 % (ref 11–15)
FASTING STATUS PATIENT QL REPORTED: NO
GLOBULIN PLAS-MCNC: 2.3 G/DL (ref 2–3.5)
GLUCOSE BLD-MCNC: 97 MG/DL (ref 70–99)
HCT VFR BLD AUTO: 35.4 %
HGB BLD-MCNC: 11.2 G/DL
IMM GRANULOCYTES # BLD AUTO: 0.02 X10(3) UL (ref 0–1)
IMM GRANULOCYTES NFR BLD: 0.3 %
INR BLD: 0.95 (ref 0.8–1.2)
LYMPHOCYTES # BLD AUTO: 1.3 X10(3) UL (ref 1–4)
LYMPHOCYTES NFR BLD AUTO: 21.5 %
MCH RBC QN AUTO: 31.3 PG (ref 26–34)
MCHC RBC AUTO-ENTMCNC: 31.6 G/DL (ref 31–37)
MCV RBC AUTO: 98.9 FL
MONOCYTES # BLD AUTO: 0.58 X10(3) UL (ref 0.1–1)
MONOCYTES NFR BLD AUTO: 9.6 %
NEUTROPHILS # BLD AUTO: 4.05 X10 (3) UL (ref 1.5–7.7)
NEUTROPHILS # BLD AUTO: 4.05 X10(3) UL (ref 1.5–7.7)
NEUTROPHILS NFR BLD AUTO: 66.8 %
OSMOLALITY SERPL CALC.SUM OF ELEC: 299 MOSM/KG (ref 275–295)
PLATELET # BLD AUTO: 190 10(3)UL (ref 150–450)
POTASSIUM SERPL-SCNC: 4.5 MMOL/L (ref 3.5–5.1)
PROT SERPL-MCNC: 6.2 G/DL (ref 5.7–8.2)
PROTHROMBIN TIME: 13.3 SECONDS (ref 11.6–14.8)
RBC # BLD AUTO: 3.58 X10(6)UL
RH BLOOD TYPE: POSITIVE
SODIUM SERPL-SCNC: 143 MMOL/L (ref 136–145)
WBC # BLD AUTO: 6.1 X10(3) UL (ref 4–11)

## 2024-07-17 PROCEDURE — 86850 RBC ANTIBODY SCREEN: CPT

## 2024-07-17 PROCEDURE — 99214 OFFICE O/P EST MOD 30 MIN: CPT | Performed by: INTERNAL MEDICINE

## 2024-07-17 PROCEDURE — 85025 COMPLETE CBC W/AUTO DIFF WBC: CPT

## 2024-07-17 PROCEDURE — 80053 COMPREHEN METABOLIC PANEL: CPT

## 2024-07-17 PROCEDURE — 85610 PROTHROMBIN TIME: CPT

## 2024-07-17 PROCEDURE — G2211 COMPLEX E/M VISIT ADD ON: HCPCS | Performed by: INTERNAL MEDICINE

## 2024-07-17 PROCEDURE — 86900 BLOOD TYPING SEROLOGIC ABO: CPT

## 2024-07-17 PROCEDURE — 86901 BLOOD TYPING SEROLOGIC RH(D): CPT

## 2024-07-17 PROCEDURE — 36415 COLL VENOUS BLD VENIPUNCTURE: CPT

## 2024-07-17 NOTE — PROGRESS NOTES
Preoperative Exam       Kwabena Spann  83 year old male here for Preop clearance.    Surgery planned ;  exp lab for  small bowel  lesions     Patient Active Problem List   Diagnosis    Abdominal aortic aneurysm (AAA) without rupture (HCC)    Hyperlipidemia    Peripheral vascular disease (HCC)    Tobacco use disorder    Leg cramps    Dysequilibrium    Stage 3 chronic kidney disease (HCC)    Primary hypertension    Nonrheumatic mitral valve regurgitation      No CAD    Has hx of AAA repaired  - endograph - aorto bili stent graft    Denies chest pain or dyspnea with activity ( is able to do greater than 4 METS )  No history of anesthesia issues   HAS  hx  of  sinus  pauses  and  psvt  - on low dose  toprol      Has  asymptomatioc  mod  MR   Denies heart failure symptoms or history  No history of CVA    Denies any severe neck arthritis  Has no history of seizures, no bleeding history, no liver disease  Does not smoke- quit  6 mos ago  , has no history of asthma, or sleep apnea  Denies significant alcohol         Current Outpatient Medications:     LOSARTAN 50 MG Oral Tab, TAKE 1 TABLET BY MOUTH EVERY DAY, Disp: 90 tablet, Rfl: 0    simvastatin 40 MG Oral Tab, Take 1 tablet (40 mg total) by mouth nightly., Disp: 90 tablet, Rfl: 0    metoprolol succinate ER 25 MG Oral Tablet 24 Hr, Take 1 tablet (25 mg total) by mouth daily., Disp: , Rfl:     clopidogrel 75 MG Oral Tab, Take 1 tablet (75 mg total) by mouth daily., Disp: , Rfl:     neomycin 500 MG Oral Tab, Take 2 tablets at 2 pm, 3 pm and 10 pm the day before surgery. (Patient not taking: Reported on 7/17/2024), Disp: 6 tablet, Rfl: 0       No Known Allergies       Past Surgical History:   Procedure Laterality Date    Colonoscopy N/A 4/30/2024    Procedure: COLONOSCOPY;  Surgeon: Wali Chung MD;  Location: [facility] ENDOSCOPY    Cyst removal      Endovascular repair, infrarenal abdominal aortic aneury      endovascular stent graft    Hernia surgery         History reviewed. No pertinent family history.   Social History     Socioeconomic History    Marital status:    Tobacco Use    Smoking status: Former     Types: Cigarettes     Start date: 4/20/1964    Smokeless tobacco: Never    Tobacco comments:     4-6    Vaping Use    Vaping status: Never Used   Substance and Sexual Activity    Alcohol use: Yes     Comment: 1 shot a day    Drug use: Never   Other Topics Concern    Weight Concern No           EXAM:     Vitals:    07/17/24 1232   BP: 118/60   Pulse: 71   VITALSBody mass index is 20.61 kg/m².       Neuro no focal findings  but seems  overwhelmed  by  all that is going  on   takes a little time to  make his point      Heart regular - MR   murmur     Lungs clear     Ext no edema - varicosities      Derm ; no infection noted     Labs and EKG :  recent  EKG  7/16  [place]  card no  ischemic changes   labs stable          Kwabena was seen today for pre-op exam.    Diagnoses and all orders for this visit:    Preop examination  -     CBC With Differential With Platelet; Future  -     Comp Metabolic Panel (14); Future  -     Prothrombin Time (PT); Future    Small bowel lesion  -     CBC With Differential With Platelet; Future  -     Comp Metabolic Panel (14); Future  -     Prothrombin Time (PT); Future    Infrarenal abdominal aortic aneurysm (AAA) without rupture (HCC)  -     Prothrombin Time (PT); Future    Weight loss, non-intentional    Stage 3a chronic kidney disease (HCC)    PSVT (paroxysmal supraventricular tachycardia) (HCC)    Nonrheumatic mitral valve regurgitation    Primary hypertension         Has hx of  aaa repair  stable  .  PAD  stable     There is no history or symptoms of ischemic heart disease, heart failure, CVA.    Does not have diabetes , does not have significant chronic kidney disease( borderline  stage 3 )      ASA Physical Status is  3  -PAD     Is able to do(functional capacity)more than 4 METS          [place]  will be  to procede with surgery  i    I    Stop plavix  one week before  surgery     This note was prepared using Dragon Medical voice recognition dictation software and as a result, errors may occur. When identified, these errors have been corrected. While every attempt is made to correct errors during dictation, discrepancies may still exist       Josef Boyce MD

## 2024-07-24 ENCOUNTER — TELEPHONE (OUTPATIENT)
Dept: SURGERY | Facility: CLINIC | Age: 84
End: 2024-07-24

## 2024-07-24 NOTE — TELEPHONE ENCOUNTER
Spoke to Huong at Dr. Kath Woodruff's office to follow-up on pending cardiac clearance. Huong stated cardiac testing is normal and message sent to cardiac clearance RN to have cardiac clearance faxed today.

## 2024-07-26 RX ORDER — SIMVASTATIN 40 MG
40 TABLET ORAL NIGHTLY
Qty: 90 TABLET | Refills: 0 | Status: ON HOLD | OUTPATIENT
Start: 2024-07-26

## 2024-07-26 RX ORDER — SODIUM CHLORIDE, SODIUM LACTATE, POTASSIUM CHLORIDE, CALCIUM CHLORIDE 600; 310; 30; 20 MG/100ML; MG/100ML; MG/100ML; MG/100ML
INJECTION, SOLUTION INTRAVENOUS CONTINUOUS
Status: DISCONTINUED | OUTPATIENT
Start: 2024-07-26 | End: 2024-07-26

## 2024-07-29 ENCOUNTER — ANESTHESIA (OUTPATIENT)
Dept: SURGERY | Facility: HOSPITAL | Age: 84
End: 2024-07-29
Payer: MEDICARE

## 2024-07-29 ENCOUNTER — HOSPITAL ENCOUNTER (INPATIENT)
Facility: HOSPITAL | Age: 84
LOS: 2 days | Discharge: HOME | End: 2024-07-31
Attending: SURGERY | Admitting: SURGERY
Payer: MEDICARE

## 2024-07-29 ENCOUNTER — ANESTHESIA EVENT (OUTPATIENT)
Dept: SURGERY | Facility: HOSPITAL | Age: 84
End: 2024-07-29
Payer: MEDICARE

## 2024-07-29 DIAGNOSIS — D12.6 TUBULAR ADENOMA OF COLON: ICD-10-CM

## 2024-07-29 DIAGNOSIS — Z01.818 PRE-OP TESTING: Primary | ICD-10-CM

## 2024-07-29 PROBLEM — Z90.49 S/P RIGHT HEMICOLECTOMY: Status: ACTIVE | Noted: 2024-07-29

## 2024-07-29 LAB — RH BLOOD TYPE: POSITIVE

## 2024-07-29 PROCEDURE — 0DBB4ZZ EXCISION OF ILEUM, PERCUTANEOUS ENDOSCOPIC APPROACH: ICD-10-PCS | Performed by: SURGERY

## 2024-07-29 PROCEDURE — 99222 1ST HOSP IP/OBS MODERATE 55: CPT | Performed by: HOSPITALIST

## 2024-07-29 PROCEDURE — 0DTH4ZZ RESECTION OF CECUM, PERCUTANEOUS ENDOSCOPIC APPROACH: ICD-10-PCS | Performed by: SURGERY

## 2024-07-29 RX ORDER — SODIUM CHLORIDE, SODIUM LACTATE, POTASSIUM CHLORIDE, CALCIUM CHLORIDE 600; 310; 30; 20 MG/100ML; MG/100ML; MG/100ML; MG/100ML
INJECTION, SOLUTION INTRAVENOUS CONTINUOUS
Status: DISCONTINUED | OUTPATIENT
Start: 2024-07-29 | End: 2024-07-29 | Stop reason: HOSPADM

## 2024-07-29 RX ORDER — OXYCODONE HYDROCHLORIDE 5 MG/1
2.5 TABLET ORAL EVERY 4 HOURS PRN
Status: DISCONTINUED | OUTPATIENT
Start: 2024-07-29 | End: 2024-07-31

## 2024-07-29 RX ORDER — LOSARTAN POTASSIUM 50 MG/1
50 TABLET ORAL DAILY
Status: DISCONTINUED | OUTPATIENT
Start: 2024-07-30 | End: 2024-07-31

## 2024-07-29 RX ORDER — OXYCODONE HYDROCHLORIDE 5 MG/1
10 TABLET ORAL EVERY 4 HOURS PRN
Status: DISCONTINUED | OUTPATIENT
Start: 2024-07-29 | End: 2024-07-31

## 2024-07-29 RX ORDER — OXYCODONE HYDROCHLORIDE 5 MG/1
5 TABLET ORAL EVERY 4 HOURS PRN
Status: DISCONTINUED | OUTPATIENT
Start: 2024-07-29 | End: 2024-07-31

## 2024-07-29 RX ORDER — HYDROMORPHONE HYDROCHLORIDE 1 MG/ML
0.8 INJECTION, SOLUTION INTRAMUSCULAR; INTRAVENOUS; SUBCUTANEOUS EVERY 2 HOUR PRN
Status: DISCONTINUED | OUTPATIENT
Start: 2024-07-29 | End: 2024-07-31

## 2024-07-29 RX ORDER — MORPHINE SULFATE 4 MG/ML
2 INJECTION, SOLUTION INTRAMUSCULAR; INTRAVENOUS EVERY 10 MIN PRN
Status: DISCONTINUED | OUTPATIENT
Start: 2024-07-29 | End: 2024-07-29 | Stop reason: HOSPADM

## 2024-07-29 RX ORDER — ACETAMINOPHEN 500 MG
1000 TABLET ORAL ONCE
Status: DISCONTINUED | OUTPATIENT
Start: 2024-07-29 | End: 2024-07-29 | Stop reason: HOSPADM

## 2024-07-29 RX ORDER — METRONIDAZOLE 500 MG/100ML
500 INJECTION, SOLUTION INTRAVENOUS ONCE
Status: COMPLETED | OUTPATIENT
Start: 2024-07-29 | End: 2024-07-29

## 2024-07-29 RX ORDER — HYDROMORPHONE HYDROCHLORIDE 1 MG/ML
0.4 INJECTION, SOLUTION INTRAMUSCULAR; INTRAVENOUS; SUBCUTANEOUS EVERY 2 HOUR PRN
Status: DISCONTINUED | OUTPATIENT
Start: 2024-07-29 | End: 2024-07-31

## 2024-07-29 RX ORDER — HYDROMORPHONE HYDROCHLORIDE 1 MG/ML
0.2 INJECTION, SOLUTION INTRAMUSCULAR; INTRAVENOUS; SUBCUTANEOUS EVERY 5 MIN PRN
Status: DISCONTINUED | OUTPATIENT
Start: 2024-07-29 | End: 2024-07-29 | Stop reason: HOSPADM

## 2024-07-29 RX ORDER — HYDROMORPHONE HYDROCHLORIDE 1 MG/ML
0.6 INJECTION, SOLUTION INTRAMUSCULAR; INTRAVENOUS; SUBCUTANEOUS EVERY 5 MIN PRN
Status: DISCONTINUED | OUTPATIENT
Start: 2024-07-29 | End: 2024-07-29 | Stop reason: HOSPADM

## 2024-07-29 RX ORDER — HYDROMORPHONE HYDROCHLORIDE 1 MG/ML
0.4 INJECTION, SOLUTION INTRAMUSCULAR; INTRAVENOUS; SUBCUTANEOUS EVERY 5 MIN PRN
Status: DISCONTINUED | OUTPATIENT
Start: 2024-07-29 | End: 2024-07-29 | Stop reason: HOSPADM

## 2024-07-29 RX ORDER — NALOXONE HYDROCHLORIDE 0.4 MG/ML
0.08 INJECTION, SOLUTION INTRAMUSCULAR; INTRAVENOUS; SUBCUTANEOUS AS NEEDED
Status: DISCONTINUED | OUTPATIENT
Start: 2024-07-29 | End: 2024-07-29 | Stop reason: HOSPADM

## 2024-07-29 RX ORDER — ONDANSETRON 2 MG/ML
4 INJECTION INTRAMUSCULAR; INTRAVENOUS EVERY 6 HOURS PRN
Status: DISCONTINUED | OUTPATIENT
Start: 2024-07-29 | End: 2024-07-31

## 2024-07-29 RX ORDER — ACETAMINOPHEN 500 MG
1000 TABLET ORAL EVERY 6 HOURS PRN
COMMUNITY

## 2024-07-29 RX ORDER — SODIUM CHLORIDE, SODIUM LACTATE, POTASSIUM CHLORIDE, CALCIUM CHLORIDE 600; 310; 30; 20 MG/100ML; MG/100ML; MG/100ML; MG/100ML
INJECTION, SOLUTION INTRAVENOUS CONTINUOUS
Status: DISCONTINUED | OUTPATIENT
Start: 2024-07-29 | End: 2024-07-31

## 2024-07-29 RX ORDER — PHENYLEPHRINE HCL 10 MG/ML
VIAL (ML) INJECTION AS NEEDED
Status: DISCONTINUED | OUTPATIENT
Start: 2024-07-29 | End: 2024-07-29 | Stop reason: SURG

## 2024-07-29 RX ORDER — HYDROMORPHONE HYDROCHLORIDE 1 MG/ML
0.2 INJECTION, SOLUTION INTRAMUSCULAR; INTRAVENOUS; SUBCUTANEOUS EVERY 2 HOUR PRN
Status: DISCONTINUED | OUTPATIENT
Start: 2024-07-29 | End: 2024-07-31

## 2024-07-29 RX ORDER — ONDANSETRON 2 MG/ML
INJECTION INTRAMUSCULAR; INTRAVENOUS AS NEEDED
Status: DISCONTINUED | OUTPATIENT
Start: 2024-07-29 | End: 2024-07-29 | Stop reason: SURG

## 2024-07-29 RX ORDER — ROCURONIUM BROMIDE 10 MG/ML
INJECTION, SOLUTION INTRAVENOUS AS NEEDED
Status: DISCONTINUED | OUTPATIENT
Start: 2024-07-29 | End: 2024-07-29 | Stop reason: SURG

## 2024-07-29 RX ORDER — MORPHINE SULFATE 10 MG/ML
6 INJECTION, SOLUTION INTRAMUSCULAR; INTRAVENOUS EVERY 10 MIN PRN
Status: DISCONTINUED | OUTPATIENT
Start: 2024-07-29 | End: 2024-07-29 | Stop reason: HOSPADM

## 2024-07-29 RX ORDER — SODIUM CHLORIDE 9 MG/ML
INJECTION, SOLUTION INTRAVENOUS CONTINUOUS PRN
Status: DISCONTINUED | OUTPATIENT
Start: 2024-07-29 | End: 2024-07-29 | Stop reason: SURG

## 2024-07-29 RX ORDER — METRONIDAZOLE 500 MG/1
500 TABLET ORAL SEE ADMIN INSTRUCTIONS
COMMUNITY
End: 2024-07-31

## 2024-07-29 RX ORDER — HYDROMORPHONE HYDROCHLORIDE 1 MG/ML
INJECTION, SOLUTION INTRAMUSCULAR; INTRAVENOUS; SUBCUTANEOUS AS NEEDED
Status: DISCONTINUED | OUTPATIENT
Start: 2024-07-29 | End: 2024-07-29 | Stop reason: SURG

## 2024-07-29 RX ORDER — MORPHINE SULFATE 4 MG/ML
4 INJECTION, SOLUTION INTRAMUSCULAR; INTRAVENOUS EVERY 10 MIN PRN
Status: DISCONTINUED | OUTPATIENT
Start: 2024-07-29 | End: 2024-07-29 | Stop reason: HOSPADM

## 2024-07-29 RX ORDER — LIDOCAINE HYDROCHLORIDE 10 MG/ML
INJECTION, SOLUTION EPIDURAL; INFILTRATION; INTRACAUDAL; PERINEURAL AS NEEDED
Status: DISCONTINUED | OUTPATIENT
Start: 2024-07-29 | End: 2024-07-29 | Stop reason: SURG

## 2024-07-29 RX ORDER — SODIUM CHLORIDE 9 MG/ML
INJECTION, SOLUTION INTRAVENOUS ONCE
Status: COMPLETED | OUTPATIENT
Start: 2024-07-29 | End: 2024-07-29

## 2024-07-29 RX ORDER — METOPROLOL SUCCINATE 25 MG/1
25 TABLET, EXTENDED RELEASE ORAL DAILY
Status: DISCONTINUED | OUTPATIENT
Start: 2024-07-30 | End: 2024-07-31

## 2024-07-29 RX ORDER — HEPARIN SODIUM 5000 [USP'U]/ML
5000 INJECTION, SOLUTION INTRAVENOUS; SUBCUTANEOUS
Status: COMPLETED | OUTPATIENT
Start: 2024-07-29 | End: 2024-07-29

## 2024-07-29 RX ORDER — ACETAMINOPHEN 500 MG
1000 TABLET ORAL EVERY 6 HOURS
Status: DISCONTINUED | OUTPATIENT
Start: 2024-07-29 | End: 2024-07-31

## 2024-07-29 RX ORDER — ATORVASTATIN CALCIUM 20 MG/1
20 TABLET, FILM COATED ORAL NIGHTLY
Status: DISCONTINUED | OUTPATIENT
Start: 2024-07-29 | End: 2024-07-31

## 2024-07-29 RX ADMIN — SODIUM CHLORIDE: 9 INJECTION, SOLUTION INTRAVENOUS at 10:13:00

## 2024-07-29 RX ADMIN — ONDANSETRON 4 MG: 2 INJECTION INTRAMUSCULAR; INTRAVENOUS at 11:53:00

## 2024-07-29 RX ADMIN — SODIUM CHLORIDE: 9 INJECTION, SOLUTION INTRAVENOUS at 11:52:00

## 2024-07-29 RX ADMIN — LIDOCAINE HYDROCHLORIDE 50 MG: 10 INJECTION, SOLUTION EPIDURAL; INFILTRATION; INTRACAUDAL; PERINEURAL at 10:16:00

## 2024-07-29 RX ADMIN — HYDROMORPHONE HYDROCHLORIDE 0.5 MG: 1 INJECTION, SOLUTION INTRAMUSCULAR; INTRAVENOUS; SUBCUTANEOUS at 11:19:00

## 2024-07-29 RX ADMIN — METRONIDAZOLE 500 MG: 500 INJECTION, SOLUTION INTRAVENOUS at 10:21:00

## 2024-07-29 RX ADMIN — PHENYLEPHRINE HCL 50 MCG: 10 MG/ML VIAL (ML) INJECTION at 11:36:00

## 2024-07-29 RX ADMIN — ROCURONIUM BROMIDE 50 MG: 10 INJECTION, SOLUTION INTRAVENOUS at 10:16:00

## 2024-07-29 NOTE — ANESTHESIA POSTPROCEDURE EVALUATION
Patient: Kwabena Spann    Procedure Summary       Date: 07/29/24 Room / Location: [facility] MAIN OR  / [facility] MAIN OR    Anesthesia Start: 1013 Anesthesia Stop: 1210    Procedure: Diagnostic laparoscopy,  lapascopic assisted right hemicolectomy (Abdomen) Diagnosis:       Tubular adenoma of colon      (Tubular adenoma of colon [D12.6])    Surgeons: Shalom Horn MD Anesthesiologist: Scotty Schwartz DO    Anesthesia Type: general ASA Status: 3            Anesthesia Type: general    Vitals Value Taken Time   /70 07/29/24 1210   Temp 97.8 °F (36.6 °C) 07/29/24 1210   Pulse 65 07/29/24 1212   Resp 16 07/29/24 1210   SpO2 92 % 07/29/24 1212   Vitals shown include unfiled device data.    [facility] AN Post Evaluation:   Patient Evaluated in PACU  Patient Participation: complete - patient participated  Level of Consciousness: awake  Pain Management: adequate  Airway Patency:patent  Dental exam unchanged from preop  Yes    Nausea/Vomiting: none  Cardiovascular Status: acceptable  Respiratory Status: acceptable  Postoperative Hydration acceptable      SCOTTY SCHWARTZ DO  7/29/2024 12:12 PM

## 2024-07-29 NOTE — H&P
[facility]    Patient Name:  Kwabena Spann   YOB: 1940   Gender:  Male   Appt Date:  7/1/2024   Provider:  No name on file   Insurance:  MEDICARE PART A&B     PATIENT PROVIDERS  Referring Provider: No ref. provider found   Address: No referring provider defined for this encounter.   Phone #: N/A    Primary Care Provider:Josef Boyce MD   Address: [unfilled]   Phone #: [phone]       CHIEF COMPLAINT  No chief complaint on file.       PROBLEMS  Reviewed   Patient Active Problem List   Diagnosis    Abdominal aortic aneurysm (AAA) without rupture (HCC)    Hyperlipidemia    Peripheral vascular disease (HCC)    Tobacco use disorder    Leg cramps    Dysequilibrium    Stage 3 chronic kidney disease (HCC)    Primary hypertension    Nonrheumatic mitral valve regurgitation        History of Present Illness:  Kwabena Spann is a 83 year old Male with PMHx AAA s/p repair, HTN, HLD, PAD, CKD who is being referred by Dr. Chung to render an opinion regarding the surgical management of abnormal CT imaging in setting of weight loss and abdominal pain.     Patient presented to GI with abdominal pain for several months and 10 lb weight loss over 2-3 months. He also endorses alternating constipation and diarrhea. CT A/P 3/12 showed mild bowel wall thickening in two section of the distal ileum with a few small soft tissue nodules, with differential including resolving enteritis or carcinoid tumor with adjacent desmoplastic reaction, but no other acute findings to explain abdominal pain. He underwent EGD/CLN showed 2 colon polyps but otherwise normal appearance. 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     Patient presents to discuss surgical options. Prior surgery includes AAA repair and hernia repair. He is on plavix related to his AAA.     Vital Signs:  /67 (BP Location: Right  arm)   Pulse 50   Temp 97.6 °F (36.4 °C) (Oral)   Resp 18   Ht 1.829 m (6')   Wt 68 kg (150 lb)   SpO2 100%   BMI 20.34 kg/m²      Medications Reviewed:  No current outpatient medications on file.     Allergies Reviewed:  No Known Allergies     History:  Reviewed:  Past Medical History:    Deep vein thrombosis (HCC)    High blood pressure    High cholesterol    History of repair of aneurysm of abdominal aorta using endovascular stent graft    Hyperlipidemia    Renal disorder    SBO (small bowel obstruction) (HCC)      Reviewed:  Past Surgical History:   Procedure Laterality Date    Colonoscopy N/A 4/30/2024    Procedure: COLONOSCOPY;  Surgeon: Wali Chung MD;  Location: [facility] ENDOSCOPY    Cyst removal      Endovascular repair, infrarenal abdominal aortic aneury      endovascular stent graft    Hernia surgery        Reviewed Social History:  Social History     Socioeconomic History    Marital status:    Tobacco Use    Smoking status: Former     Types: Cigarettes     Start date: 4/20/1964    Smokeless tobacco: Never    Tobacco comments:     4-6    Vaping Use    Vaping status: Never Used   Substance and Sexual Activity    Alcohol use: Yes     Comment: 1 shot a day    Drug use: Never   Other Topics Concern    Weight Concern No      Reviewed:  History reviewed. No pertinent family history.     Review of Systems:  Review of Systems   Constitutional:  Negative for activity change, appetite change, chills, fatigue, fever and unexpected weight change.   HENT:  Negative for mouth sores, nosebleeds and trouble swallowing.    Eyes:  Negative for discharge and redness.   Respiratory:  Negative for cough, shortness of breath and wheezing.    Cardiovascular:  Negative for chest pain.   Gastrointestinal:  Negative for abdominal distention, abdominal pain, blood in stool, nausea and vomiting.   Genitourinary:  Negative for difficulty urinating and dysuria.   Musculoskeletal:  Negative for back pain and gait  problem.   Skin:  Negative for color change.   Allergic/Immunologic: Negative for immunocompromised state.   Neurological:  Negative for speech difficulty, weakness and numbness.   Psychiatric/Behavioral:  Negative for confusion.         Physical Examination:  Physical Exam  Constitutional:       General: He is not in acute distress.     Appearance: He is well-developed. He is not diaphoretic.   HENT:      Head: Normocephalic and atraumatic.   Eyes:      General:         Right eye: No discharge.         Left eye: No discharge.      Conjunctiva/sclera: Conjunctivae normal.      Pupils: Pupils are equal, round, and reactive to light.   Neck:      Thyroid: No thyromegaly.   Cardiovascular:      Rate and Rhythm: Normal rate and regular rhythm.      Heart sounds: No murmur heard.  Pulmonary:      Effort: No respiratory distress.      Breath sounds: Normal breath sounds. No wheezing.   Abdominal:      General: Bowel sounds are normal. There is no distension.      Palpations: Abdomen is soft.      Tenderness: There is no abdominal tenderness.      Hernia: No hernia is present.   Musculoskeletal:         General: Normal range of motion.   Skin:     General: Skin is warm and dry.   Neurological:      Mental Status: He is alert and oriented to person, place, and time.          Assessment / Plan:  Proceed with diagnostic laparoscopy and possible bowel resection     Shalom Horn MD  [facility] Surgical Oncology  [facility]  [email or web address]        Follow Up:  No follow-ups on file.       Electronically Signed by: No name on file

## 2024-07-29 NOTE — CONSULTS
[facility]    PATIENT'S NAME: AYDE FREGOSO   ATTENDING PHYSICIAN: Shalom Horn MD   CONSULTING PHYSICIAN: Jose Donald MD   PATIENT ACCOUNT#:   [id number]    LOCATION:  [facility] PACU 5 [facility] 10  MEDICAL RECORD #:   [id number]       YOB: 1940  ADMISSION DATE:       07/29/2024      CONSULT DATE:  07/29/2024    REPORT OF CONSULTATION      REASON FOR ADMISSION:  Laparoscopic-assisted right hemicolectomy.    HISTORY OF PRESENT ILLNESS:  The patient is an 83-year-old  male with weight loss and abdominal discomfort.  Workup including colonoscopy was unremarkable.  Terminal ileum biopsy was negative.  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  Chromogranin A was elevated as an outpatient.  He was evaluated by Surgical Oncology and scheduled today for above-mentioned procedure by Dr. Horn.  Postoperatively transferred to PACU for further monitoring.    PAST MEDICAL HISTORY:  Hypertension, hyperlipidemia, peripheral arterial disease, history of DVT and paroxysmal supraventricular tachycardia.     PAST SURGICAL HISTORY:  Abdominal aortic aneurysm endovascular repair, hernia repair.    MEDICATIONS:  Please see medication reconciliation list.    ALLERGIES:  No known drug allergies.     SOCIAL HISTORY:  Ex-tobacco user.  Social alcohol.  No drug use.  Lives with his family.  Independent in his basic activities of daily living.    REVIEW OF SYSTEMS:  Currently with abdominal discomfort.  No chest pain.  No shortness of breath.  Other 12-point review of systems negative.      PHYSICAL EXAMINATION:    GENERAL:  Alert, oriented to time, place, and person, mild to moderate distress.  VITAL SIGNS:  Temperature 97.8, pulse 64, respiratory rate 20, blood pressure 124/70, pulse ox 99% on 1L nasal cannula oxygen.  HEENT:   Atraumatic.  Oropharynx clear.  Moist mucous membranes.  Ears, nose normal.  Eyes:  Anicteric sclerae.   NECK:  Supple.  No lymphadenopathy.  Trachea midline.  Full range of motion.  LUNGS:  Clear to auscultation bilaterally.  Normal respiratory effort.   HEART:  Regular rate and rhythm.  S1 and S2 auscultated.  No murmur.  ABDOMEN:  Soft, nondistended.  No tenderness.  Laparoscopic incisions.  Hypoactive bowel sounds.  EXTREMITIES:  No peripheral edema, clubbing, or cyanosis.  NEUROLOGIC:  Motor and sensory intact.      ASSESSMENT AND PLAN:    1.   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  Chromogranin elevated as an outpatient.  Rule out neuroendocrine tumor.  Full pathology report still pending.  Clear liquid diet.  Monitor hemodynamic status.  DVT prophylaxis.    2.   Essential hypertension.  Continue home medications and monitor.  3.   Hyperlipidemia.  Continue home medications.    Dictated By Jose Donald MD  d: 07/29/2024 13:01:53  t: 07/29/2024 13:26:29  Job [id number]  FB/

## 2024-07-29 NOTE — PLAN OF CARE
Patient admitted to floor s/p surgical procedure. Vitals stable. PRN Dilaudid and Scheduled Tylenol administered for pain. Surgical Incisions intact. Clear liquids tolerated. Awaiting void, post catheter. Bladder scan only revealing 30ml. IVF infusing. Call light in reach. Plan of care continued as ordered.

## 2024-07-29 NOTE — OPERATIVE REPORT
Date of operation 7/29/2024    Preoperative diagnosis:  1.  Small bowel mass, concern for tumor    Operations performed:  1.  Diagnostic laparoscopy  2.  Ileocecectomy with primary ileocolonic anastomosis.      Postoperative diagnosis:  1.  Same    Operating Surgeon:  1.  Shalom Horn MD    Assistant:  Surgical Assistant.: Kay Washington RSA     Indications:  Very pleasant 83-year-old gentleman who presented to the gastroenterology team with failure to thrive.  Workup included a CT scan which revealed evidence of small bowel thickening and changes concerning for tumor versus inflammatory thickening.  Case was reviewed in tumor board.  Patient presents for definitive surgical management.    Operative findings:  Thickened segment of terminal ileum consistent with preoperative imaging.  Proximally, bowel appeared chronically dilated however not completely obstructed.  Desmoplastic reaction along the small bowel mesentery with marginally enlarged lymph nodes, and a polypoid/fleshy submucosal mass in the terminal ileum.  Decision made to resect.    Details of the operation:  Patient was brought to the operating room in the supine on the operating table.  General tracheal anesthesia was induced without complications.  All pressure points were padded appropriate.  The arms were tucked.  The abdomen was clipped prepped and draped in standard sterile manner.  Timeout was completed verifying the patient's name, procedure to be performed and the administration of antibiotics.  Everybody in the room was in agreement.  A nick in the skin was made in the left upper quadrant a Verres needle was introduced.  Pneumoperitoneum was established.  3 x 5 mm working ports were placed in a straight line across the left hemiabdomen.  Examination of the abdominal cavity revealed no evidence of peritoneal metastasis.  The terminal ileum was then examined and findings were as above.  Decision was made early on to proceed with resection.  The  ascending colon was mobilized from lateral to medial along the line of Toldt.  The right ureter was identified and protected.  The colon was mobilized such that a tension-free anastomosis could be performed to the midline.  At this point, a limited midline incision was made and wound protector placed.  The bowel was externalized and run from the mid jejunum towards terminal ileum.  Findings were as above.  At a point proximal to the abnormal appearing bowel, antimesenteric window was created and a blue load stapler was used to divide the intestines.  Along the ascending colon just distal to the cecum, an antimesenteric window was again created and the colon was divided.  The remainder of the mesentery was taken with the vessel sealer in a manner to include the involved mesenteric adenopathy and changes.  In total, a 20 cm segment of small intestine was included alongside the cecum.  The ileal colic pedicle was divided at its base with a white load/vascular linear stapler.  Hemostasis was excellent.  A side-to-side isoperistaltic anastomosis was then fashioned per usual by firing a 60 mm blue load across.  The common enterotomy was closed in a running manner using 3-0 PDS suture per usual.  Hemostasis was excellent.  The procedure was concluded.  The fascia was closed in a running manner using 0 Vicryl suture.  The skin subtenons tissue were irrigated and the skin approximated in subcuticular manner using 4-0 Vicryl suture.  Patient tolerated the procedure well was taken to the postoperative acetic unit in a stable state.  I was present for the entire case.      Shalom Horn MD  Complex General Surgical Oncology  [facility]  [email or web address]

## 2024-07-29 NOTE — ANESTHESIA PREPROCEDURE EVALUATION
Anesthesia PreOp Note    HPI:     Kwabena Spann is a 83 year old male who presents for preoperative consultation requested by: Shalom Horn MD    Date of Surgery: 7/29/2024    Procedure(s):  Diagnostic laparoscopy and possible right hemicolectomy  Indication: Tubular adenoma of colon [D12.6]    Relevant Problems   No relevant active problems       NPO:  Last Liquid Consumption Date: 07/29/24  Last Liquid Consumption Time: 0400 (ENSURE PREP)  Last Solid Consumption Date: 07/28/24  Last Solid Consumption Time: 1100  Last Liquid Consumption Date: 07/29/24          History Review:  Patient Active Problem List    Diagnosis Date Noted    Nonrheumatic mitral valve regurgitation 02/05/2023    Stage 3 chronic kidney disease (HCC) 12/30/2021    Primary hypertension 12/30/2021    Leg cramps 10/04/2021    Dysequilibrium 10/04/2021    Abdominal aortic aneurysm (AAA) without rupture (HCC) 10/17/2014    Peripheral vascular disease (HCC) 04/02/2013    Tobacco use disorder 04/02/2013    Hyperlipidemia 03/28/2013       Past Medical History:    Deep vein thrombosis (HCC)    High blood pressure    High cholesterol    History of repair of aneurysm of abdominal aorta using endovascular stent graft    Hyperlipidemia    Renal disorder    SBO (small bowel obstruction) (HCC)       Past Surgical History:   Procedure Laterality Date    Colonoscopy N/A 4/30/2024    Procedure: COLONOSCOPY;  Surgeon: Wali Chung MD;  Location: [facility] ENDOSCOPY    Cyst removal      Endovascular repair, infrarenal abdominal aortic aneury      endovascular stent graft    Hernia surgery         Medications Prior to Admission   Medication Sig Dispense Refill Last Dose    acetaminophen 500 MG Oral Tab Take 2 tablets (1,000 mg total) by mouth every 6 (six) hours as needed for Pain.   7/29/2024 at 0600    metRONIDAZOLE 500 MG Oral Tab Take 1 tablet (500 mg total) by mouth See Admin Instructions. Take 2 tablets at 2 pm, 3 pm and 10 pm the day before  surgery.   7/28/2024 at 2200    LOSARTAN 50 MG Oral Tab TAKE 1 TABLET BY MOUTH EVERY DAY 90 tablet 0 7/26/2024 at 2200    neomycin 500 MG Oral Tab Take 2 tablets at 2 pm, 3 pm and 10 pm the day before surgery. 6 tablet 0 7/28/2024 at 2200    polyethylene glycol, PEG 3350-KCl-NaBcb-NaCl-NaSulf, (GAVILYTE-G) 236 g Oral Recon Soln Take 4,000 mL by mouth once for 1 dose. Begin at 2pm to 3pm the day before surgery. Follow pharmacy product instructions. 4000 mL 0 7/28/2024    metoprolol succinate ER 25 MG Oral Tablet 24 Hr Take 1 tablet (25 mg total) by mouth daily.   7/25/2024 at 1000    SIMVASTATIN 40 MG Oral Tab TAKE 1 TABLET BY MOUTH EVERY DAY AT NIGHT 90 tablet 0 7/26/2024 at 2200    clopidogrel 75 MG Oral Tab Take 1 tablet (75 mg total) by mouth daily.   7/18/2024 at 1000     Current Facility-Administered Medications Ordered in Epic   Medication Dose Route Frequency Provider Last Rate Last Admin    acetaminophen (Tylenol Extra Strength) tab 1,000 mg  1,000 mg Oral Once Shalom Horn MD        metoprolol tartrate (Lopressor) tab 25 mg  25 mg Oral Once PRN Shalom Horn MD        cefTRIAXone (Rocephin) 2 g in sodium chloride 0.9% 100 mL IVPB-ADDV  2 g Intravenous Once Shalom Horn MD        metRONIDAZOLE in sodium chloride 0.79% (Flagyl) 5 mg/mL IVPB premix 500 mg  500 mg Intravenous Once Shalom Horn MD         No current [facility]-ordered outpatient medications on file.       No Known Allergies    History reviewed. No pertinent family history.  Social History     Socioeconomic History    Marital status:    Tobacco Use    Smoking status: Former     Types: Cigarettes     Start date: 4/20/1964    Smokeless tobacco: Never    Tobacco comments:     4-6    Vaping Use    Vaping status: Never Used   Substance and Sexual Activity    Alcohol use: Yes     Comment: 1 shot a day    Drug use: Never   Other Topics Concern    Weight Concern No       Available pre-op labs reviewed.  Lab Results   Component Value Date     WBC 6.1 07/17/2024    RBC 3.58 (L) 07/17/2024    HGB 11.2 (L) 07/17/2024    HCT 35.4 (L) 07/17/2024    MCV 98.9 07/17/2024    MCH 31.3 07/17/2024    MCHC 31.6 07/17/2024    RDW 13.2 07/17/2024    .0 07/17/2024     Lab Results   Component Value Date     07/17/2024    K 4.5 07/17/2024     07/17/2024    CO2 26.0 07/17/2024    BUN 22 07/17/2024    CREATSERUM 1.16 07/17/2024    GLU 97 07/17/2024    CA 9.0 07/17/2024     Lab Results   Component Value Date    INR 0.95 07/17/2024       Vital Signs:  Body mass index is 20.34 kg/m².   height is 1.829 m (6') and weight is 68 kg (150 lb). His oral temperature is 97.6 °F (36.4 °C). His blood pressure is 112/67 and his pulse is 50. His respiration is 18 and oxygen saturation is 100%.   Vitals:    07/17/24 1121 07/29/24 0647   BP:  112/67   Pulse:  50   Resp:  18   Temp:  97.6 °F (36.4 °C)   TempSrc:  Oral   SpO2:  100%   Weight: 68 kg (150 lb)    Height: 1.829 m (6')         Anesthesia Evaluation     Patient summary reviewed and Nursing notes reviewed    No history of anesthetic complications   Airway   Mallampati: I  TM distance: >3 FB  Neck ROM: full  Dental      Pulmonary - negative ROS and normal exam   Cardiovascular - normal exam  (+) hypertension    ROS comment: Impression - MPI  Stress EKG is normal.    Impression - MPI  Pt denied chest pain   Impression - MPI  Occasional PACs   Impression - MPI  This is a normal perfusion study, no perfusion defects noted.    Impression - MPI  The left ventricular cavity is noted to be normal on the stress study. The left ventricular ejection fraction was calculated to be 65% and left ventricular global function is normal.    Impression - MPI  The study quality is average.       Neuro/Psych - negative ROS     GI/Hepatic/Renal - negative ROS     Endo/Other    Abdominal                  Anesthesia Plan:   ASA:  3  Plan:   General  Informed Consent Plan and Risks Discussed With:  Patient      I have informed Kwabena Myles  Zana and/or legal guardian or family member of the nature of the anesthetic plan, benefits, risks including possible dental damage if relevant, major complications, and any alternative forms of anesthetic management.   All of the patient's questions were answered to the best of my ability. The patient desires the anesthetic management as planned.  Iron Beauchamp MD  7/29/2024 9:38 AM  Present on Admission:  **None**

## 2024-07-29 NOTE — ANESTHESIA PROCEDURE NOTES
Airway  Date/Time: 7/29/2024 10:18 AM  Urgency: Elective    Airway not difficult    General Information and Staff    Patient location during procedure: OR  Anesthesiologist: Iron Beauchamp MD  Performed: anesthesiologist   Performed by: Iron Beauchamp MD  Authorized by: Iron Beauchamp MD      Indications and Patient Condition  Indications for airway management: anesthesia  Sedation level: deep  Preoxygenated: yes  Patient position: sniffing  Mask difficulty assessment: 1 - vent by mask    Final Airway Details  Final airway type: endotracheal airway      Successful airway: ETT  Cuffed: yes   Successful intubation technique: Video laryngoscopy  Facilitating devices/methods: intubating stylet  Endotracheal tube insertion site: oral  Blade: GlideScope  Blade size: #3  ETT size (mm): 7.5    Cormack-Lehane Classification: grade I - full view of glottis  Placement verified by: capnometry   Measured from: teeth  Number of attempts at approach: 1

## 2024-07-30 ENCOUNTER — APPOINTMENT (OUTPATIENT)
Dept: GENERAL RADIOLOGY | Facility: HOSPITAL | Age: 84
End: 2024-07-30
Attending: PHYSICIAN ASSISTANT
Payer: MEDICARE

## 2024-07-30 LAB
ANION GAP SERPL CALC-SCNC: 5 MMOL/L (ref 0–18)
BUN BLD-MCNC: 15 MG/DL (ref 9–23)
BUN/CREAT SERPL: 13.8 (ref 10–20)
CALCIUM BLD-MCNC: 8.5 MG/DL (ref 8.7–10.4)
CHLORIDE SERPL-SCNC: 111 MMOL/L (ref 98–112)
CO2 SERPL-SCNC: 25 MMOL/L (ref 21–32)
CREAT BLD-MCNC: 1.09 MG/DL
DEPRECATED RDW RBC AUTO: 45 FL (ref 35.1–46.3)
EGFRCR SERPLBLD CKD-EPI 2021: 68 ML/MIN/1.73M2 (ref 60–?)
ERYTHROCYTE [DISTWIDTH] IN BLOOD BY AUTOMATED COUNT: 13.1 % (ref 11–15)
GLUCOSE BLD-MCNC: 96 MG/DL (ref 70–99)
HCT VFR BLD AUTO: 31.3 %
HGB BLD-MCNC: 10.4 G/DL
MAGNESIUM SERPL-MCNC: 1.6 MG/DL (ref 1.6–2.6)
MCH RBC QN AUTO: 31.4 PG (ref 26–34)
MCHC RBC AUTO-ENTMCNC: 33.2 G/DL (ref 31–37)
MCV RBC AUTO: 94.6 FL
OSMOLALITY SERPL CALC.SUM OF ELEC: 293 MOSM/KG (ref 275–295)
PHOSPHATE SERPL-MCNC: 3.9 MG/DL (ref 2.4–5.1)
PLATELET # BLD AUTO: 141 10(3)UL (ref 150–450)
POTASSIUM SERPL-SCNC: 4.1 MMOL/L (ref 3.5–5.1)
RBC # BLD AUTO: 3.31 X10(6)UL
SODIUM SERPL-SCNC: 141 MMOL/L (ref 136–145)
WBC # BLD AUTO: 6.9 X10(3) UL (ref 4–11)

## 2024-07-30 PROCEDURE — 99232 SBSQ HOSP IP/OBS MODERATE 35: CPT | Performed by: HOSPITALIST

## 2024-07-30 PROCEDURE — 74018 RADEX ABDOMEN 1 VIEW: CPT | Performed by: PHYSICIAN ASSISTANT

## 2024-07-30 RX ORDER — MAGNESIUM OXIDE 400 MG/1
400 TABLET ORAL ONCE
Status: COMPLETED | OUTPATIENT
Start: 2024-07-30 | End: 2024-07-30

## 2024-07-30 NOTE — PHYSICAL THERAPY NOTE
PHYSICAL THERAPY EVALUATION - INPATIENT     Room Number: 447B/447-B  Evaluation Date: 7/30/2024  Type of Evaluation: Initial   Physician Order: PT Eval and Treat    Presenting Problem: s/p hemicolectomy hyperlipidemia  Co-Morbidities : leg cramps, PVD, MVR  Reason for Therapy: Mobility Dysfunction and Discharge Planning    PHYSICAL THERAPY ASSESSMENT   Patient is a 83 year old male admitted 7/29/2024 for Hyperlipidemia, s/p R hemicolectomy.  Prior to admission, patient's baseline is lives with family spouse and daughter indep with all mobility and ADL's, retired.  Patient is currently functioning near baseline with bed mobility, transfers, gait, stair negotiation, maintaining seated position, standing prolonged periods, and performing household tasks.  Patient is requiring supervision as a result of the following impairments: decreased functional strength, decreased endurance/aerobic capacity, impaired standing balance, decreased muscular endurance, and medical status.  Physical Therapy will continue to follow for duration of hospitalization.    Patient will benefit from continued skilled PT Services with no additional skilled services but increased support at home.    PLAN  PT Treatment Plan: Bed mobility;Body mechanics;Endurance;Energy conservation;Patient education;Gait training;Strengthening;Transfer training;Balance training;Stoop training;Stair training  Rehab Potential : Good  Frequency (Obs): Daily    PHYSICAL THERAPY MEDICAL/SOCIAL HISTORY   History related to current admission: The patient is an 83-year-old  male with weight loss and abdominal discomfort. Workup including colonoscopy was unremarkable. Terminal ileum biopsy was negative. 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 Chromogranin A was elevated as an outpatient. He was  evaluated by Surgical Oncology and scheduled today for above-mentioned procedure by Dr. Horn. Postoperatively transferred to PACU for further monitoring.      Problem List  Active Problems:    Hyperlipidemia    Primary hypertension    S/P right hemicolectomy      HOME SITUATION  Home Situation  Type of Home: House  Home Layout: Two level  Stairs to Enter : 4  Railing: Yes  Stairs to Bedroom: 6  Railing: Yes  Lives With: Spouse;Family;Daughter  Patient Owned Equipment: None     Prior Level of [place]: Lives at home with spouse and daughter indep with all mobility and ADL's, retired.     SUBJECTIVE  I feel pretty good considering I had surgery. It's a little sore with walking but I have been using the RW and that helps. I may need a RW for home.     PHYSICAL THERAPY EXAMINATION   OBJECTIVE  Precautions: Abdominal protective strategies  Fall Risk: Standard fall risk    WEIGHT BEARING RESTRICTION  Weight Bearing Restriction: None                PAIN ASSESSMENT  Rating: 3  Location: abd surgical site with movement  Management Techniques: Activity promotion;Body mechanics;Breathing techniques;Relaxation;Repositioning    COGNITION  Overall Cognitive Status:  WFL - within functional limits    RANGE OF MOTION AND STRENGTH ASSESSMENT  Upper extremity ROM and strength are within functional limits   Lower extremity ROM is within functional limits   Lower extremity strength is within functional limits     BALANCE  Static Sitting: Fair +  Dynamic Sitting: Fair  Static Standing: Fair -  Dynamic Standing: Fair -    ACTIVITY TOLERANCE  Pulse: 67  Heart Rate Source: Monitor  Resp: 18  BP: 144/85  BP Location: Right arm  BP Method: Automatic  Patient Position: Sitting    O2 WALK  Oxygen Therapy  SPO2% Ambulation on Oxygen: 97    AM-PAC '6-Clicks' INPATIENT SHORT FORM - BASIC MOBILITY  How much difficulty does the patient currently have...  Patient Difficulty: Turning over in bed (including adjusting bedclothes, sheets and  blankets)?: None   Patient Difficulty: Sitting down on and standing up from a chair with arms (e.g., wheelchair, bedside commode, etc.): None   Patient Difficulty: Moving from lying on back to sitting on the side of the bed?: None   How much help from another person does the patient currently need...   Help from Another: Moving to and from a bed to a chair (including a wheelchair)?: A Little   Help from Another: Need to walk in hospital room?: A Little   Help from Another: Climbing 3-5 steps with a railing?: A Little     AM-PAC Score:  Raw Score: 21   Approx Degree of Impairment: 28.97%   Standardized Score (AM-PAC Scale): 50.25   CMS Modifier (G-Code): CJ    FUNCTIONAL ABILITY STATUS  Functional Mobility/Gait Assessment  Gait Assistance: Supervision  Distance (ft): 450  Assistive Device: Rolling walker  Pattern:  (decreased step length and vasquez)  Rolling: supervision  Supine to Sit: supervision  Sit to Supine: supervision  Sit to Stand: supervision    Exercise/Education Provided:  Bed mobility  Body mechanics  Energy conservation  Functional activity tolerated  Gait training  Posture  Strengthening  Lower therapeutic exercise:  Ankle pumps  Heel slides  Transfer training    Skilled Therapy Provided: Pt ed with bed mobility with log roll reviewed with SBA to EOB. Pt ed with transfers and gait progression amb 450' with RW with SBA with decreased step length and vasquez. Pt ed with therex in chair x 10 reps each. Pt is on track for a return home with family assist with a RW as medical progress allows.     The patient's Approx Degree of Impairment: 28.97% has been calculated based on documentation in the [facility] '6 clicks' Inpatient Basic Mobility Short Form.  Research supports that patients with this level of impairment may benefit from Home with family assist with a RW.  Final disposition will be made by interdisciplinary medical team.    Patient End of Session: Up in chair;With  staff;Needs met;Call light within  reach;RN aware of session/findings;All patient questions and concerns addressed;Family present    CURRENT GOALS  Goals to be met by: 8/15/2024  Patient Goal Patient's self-stated goal is: Return home    Goal #1 Patient is able to demonstrate supine - sit EOB @ level: independent     Goal #1   Current Status    Goal #2 Patient is able to demonstrate transfers Sit to/from Stand at assistance level: modified independent with walker - rolling     Goal #2  Current Status    Goal #3 Patient is able to ambulate 300 feet with assist device: walker - rolling at assistance level: modified independent   Goal #3   Current Status    Goal #4 Patient will negotiate 6 stairs/one curb w/ assistive device and supervision   Goal #4   Current Status    Goal #5 Patient to demonstrate independence with home activity/exercise instructions provided to patient in preparation for discharge.   Goal #5   Current Status    Goal #6    Goal #6  Current Status      Patient Evaluation Complexity Level:  History Low - no personal factors and/or co-morbidities   Examination of body systems Low -  addressing 1-2 elements   Clinical Presentation Low- Stable   Clinical Decision Making  Low Complexity     Gait Trainin minutes

## 2024-07-30 NOTE — PLAN OF CARE
Problem: Patient Centered Care  Goal: Patient preferences are identified and integrated in the patient's plan of care  Description: Interventions:  - What would you like us to know as we care for you?   - Provide timely, complete, and accurate information to patient/family  - Incorporate patient and family knowledge, values, beliefs, and cultural backgrounds into the planning and delivery of care  - Encourage patient/family to participate in care and decision-making at the level they choose  - Honor patient and family perspectives and choices  Outcome: Progressing     Problem: Patient/Family Goals  Goal: Patient/Family Long Term Goal  Description: Patient's Long Term Goal:     Interventions:  -   - See additional Care Plan goals for specific interventions  Outcome: Progressing  Goal: Patient/Family Short Term Goal  Description: Patient's Short Term Goal:     Interventions:   -   - See additional Care Plan goals for specific interventions  Outcome: Progressing     Pt having minimal pain, scheduled tylenol given. Oxycodone prn. Dressings to abdomen intact. Straight cath done, urinated in AM. Tolerating diet. Remote tele in place. IVF continued. Walked in hallway with walker and assist-tolerated well. Safety measures in place. Frequent rounding being done.

## 2024-07-30 NOTE — OCCUPATIONAL THERAPY NOTE
OCCUPATIONAL THERAPY EVALUATION - INPATIENT     Room Number: 447B/447-B  Evaluation Date: 2024  Type of Evaluation: Initial  Presenting Problem: S/p diagnostic laparoscopy, ileocectomy with primary ileocolonic anastomic     Physician Order: IP Consult to Occupational Therapy  Reason for Therapy: ADL/IADL Dysfunction and Discharge Planning    OCCUPATIONAL THERAPY ASSESSMENT   Patient is a 83 year old male admitted 2024 for S/p diagnostic laparoscopy, ileocectomy with primary ileocolonic anastomic .  Prior to admission, patient's baseline is independent.  Patient is currently functioning below baseline with ADLs and functional mobility.  Patient is requiring CGA-SBA as a result of the following impairments: pain -limited reach, decreased standing balance, fatigue . Occupational Therapy will continue to follow for duration of hospitalization.    Patient will benefit from continued skilled OT Services at discharge to promote prior level of function and safety with additional support and return home with home health OT    PLAN  OT Treatment Plan: Balance activities;Energy conservation/work simplification techniques;ADL training;Functional transfer training;Endurance training;Equipment eval/education;Compensatory technique education  OT Device Recommendations: TBD    OCCUPATIONAL THERAPY MEDICAL/SOCIAL HISTORY   Problem List  Active Problems:    Hyperlipidemia    Primary hypertension    S/P right hemicolectomy    HOME SITUATION  Type of Home: House  Home Layout: Two level  Lives With: Spouse; Family; Daughter    SUBJECTIVE  Agreeable to activity     OCCUPATIONAL THERAPY EXAMINATION    OBJECTIVE  Precautions: Abdominal protective strategies  Fall Risk: Standard fall risk    PAIN ASSESSMENT  Ratin    ACTIVITY TOLERANCE  Room air    COGNITION  WFL    RANGE OF MOTION   Upper extremity ROM is within functional limits     STRENGTH ASSESSMENT  Upper extremity strength is within functional limits      COORDINATION  Gross Motor: WFL   Fine Motor: WFL     ACTIVITIES OF DAILY LIVING ASSESSMENT  AM-PAC ‘6-Clicks’ Inpatient Daily Activity Short Form  How much help from another person does the patient currently need…  -   Putting on and taking off regular lower body clothing?: A Little  -   Bathing (including washing, rinsing, drying)?: A Little  -   Toileting, which includes using toilet, bedpan or urinal? : A Little  -   Putting on and taking off regular upper body clothing?: A Little  -   Taking care of personal grooming such as brushing teeth?: A Little  -   Eating meals?: A Little    AM-PAC Score:  Score: 18  Approx Degree of Impairment: 46.65%  Standardized Score (AM-PAC Scale): 38.66  CMS Modifier (G-Code): CK    FUNCTIONAL TRANSFER ASSESSMENT  Sit to Stand: Chair  Chair: Contact Guard Assist    BED MOBILITY  Supine to Sit : Not tested  Sit to Supine (OT): Moderate Assist    FUNCTIONAL ADL ASSESSMENT  Grooming Standing: Contact Guard Assist  LB Dressing Seated: Minimal Assist    Skilled Therapy Provided: RN cleared pt for participation in occupational therapy session. Upon arrival, pt was seated in bedside chair and agreeable to activity. No family was present during session. Pt benefited from additional time, verbal cues to maximize participation.    EDUCATION PROVIDED  Pt instructed on plan of care and abdominal sparing strategies for pain management --discussed compensatory strategies such as using log roll for bed mobility and cross leg technique for LE dressing.  Will benefit from continued education and practice.     Energy conservation, pacing   Patient: Role of Occupational Therapy; Plan of Care; Functional Transfer Techniques; Compensatory ADL Techniques  Patient's Response to Education: Verbalized Understanding; Returned Demonstration    The patient's Approx Degree of Impairment: 46.65% has been calculated based on documentation in the [facility] '6 clicks' Inpatient Daily Activity Short Form.  Research  supports that patients with this level of impairment may benefit from HHOT.  Final disposition will be made by interdisciplinary medical team.     Patient End of Session: In bed;Needs met;Call light within reach;RN aware of session/findings    OT Goals  Patients self stated goal is: home with family      Patient will complete functional transfer with Mod I  Comment:     Patient will complete LE dressing with Mod I  Comment:     Patient will tolerate standing for 5 minutes in prep for adls with Mod I   Comment:     Comment:          Goals  on: 24  Frequency: 3-5x/w    Patient Evaluation Complexity Level:   Occupational Profile/Medical History LOW - Brief history including review of medical or therapy records    Specific performance deficits impacting engagement in ADL/IADL LOW  1 - 3 performance deficits    Client Assessment/Performance Deficits LOW - No comorbidities nor modifications of tasks    Clinical Decision Making LOW - Analysis of occupational profile, problem-focused assessments, limited treatment options    Overall Complexity LOW     OT Session Time: 15 minutes  Self-Care Home Management: 15 minutes

## 2024-07-30 NOTE — PLAN OF CARE
Vitals within normal limits. Advanced diet as tolerated per order. Advanced to general. Patient tolerating diet. Denies nausea or vomiting. Meals sitting up in chair. Ambulated to bathroom and ambulated halls as well.   LR at 50 ml/hr. Magnesium replaced per protocol.   +Voiding clear, yellow urine.   Reports minimal pain. On scheduled tylenol.   PT/OT consult - recommending walker, order was placed.   XR Abdomen ordered - see results.   Call light within reach. Family at bedside this afternoon.     Problem: Patient Centered Care  Goal: Patient preferences are identified and integrated in the patient's plan of care  Description: Interventions:  - What would you like us to know as we care for you? Discharge home with wife  - Provide timely, complete, and accurate information to patient/family  - Incorporate patient and family knowledge, values, beliefs, and cultural backgrounds into the planning and delivery of care  - Encourage patient/family to participate in care and decision-making at the level they choose  - Honor patient and family perspectives and choices  Outcome: Progressing     Problem: Patient/Family Goals  Goal: Patient/Family Long Term Goal  Description: Patient's Long Term Goal: Discharge home  Interventions:  - XR Abdomen  - Pain control  - Tolerate diet  -Ambulating safely  - Walker per physical therapy recommendations  - See additional Care Plan goals for specific interventions  Outcome: Progressing  Goal: Patient/Family Short Term Goal  Description: Patient's Short Term Goal: Free from urine retention    Interventions:   - Encourage ambulation  - Straight cath per protocol  - Monitor intake and output  - Bladder Scan  - Toilet schedule  - See additional Care Plan goals for specific interventions  Outcome: Progressing     Problem: PAIN - ADULT  Goal: Verbalizes/displays adequate comfort level or patient's stated pain goal  Description: INTERVENTIONS:  - Encourage pt to monitor pain and request  assistance  - Assess pain using appropriate pain scale  - Administer analgesics based on type and severity of pain and evaluate response  - Implement non-pharmacological measures as appropriate and evaluate response  - Consider cultural and social influences on pain and pain management  - Manage/alleviate anxiety  - Utilize distraction and/or relaxation techniques  - Monitor for opioid side effects  - Notify MD/LIP if interventions unsuccessful or patient reports new pain  - Anticipate increased pain with activity and pre-medicate as appropriate  Outcome: Progressing     Problem: SAFETY ADULT - FALL  Goal: Free from fall injury  Description: INTERVENTIONS:  - Assess pt frequently for physical needs  - Identify cognitive and physical deficits and behaviors that affect risk of falls.  - [place] fall precautions as indicated by assessment.  - Educate pt/family on patient safety including physical limitations  - Instruct pt to call for assistance with activity based on assessment  - Modify environment to reduce risk of injury  - Provide assistive devices as appropriate  - Consider OT/PT consult to assist with strengthening/mobility  - Encourage toileting schedule  Outcome: Progressing

## 2024-07-30 NOTE — PROGRESS NOTES
[facility]  part of [facility]    Progress Note    Kwabena Spann Patient Status:  Inpatient    1940 MRN [id number]   Location [facility] 4W/SW/SE Attending Abbey Coffey MD   Hosp Day # 1 PCP Josef Boyce MD     Chief Complaint: weight loss abd pain. Neuroendocrine tumor     Subjective:   Kwabena Spann is doing well. Minimal abd soreness 2/10 in intensity. No F/C no CP or SOB.       Objective:   Objective:    Blood pressure 144/85, pulse 67, temperature 97.8 °F (36.6 °C), temperature source Oral, resp. rate 18, height 6' (1.829 m), weight 150 lb (68 kg), SpO2 100%.    Physical Exam:    General: No acute distress.   Respiratory: Clear to auscultation bilaterally. No wheezes. No rhonchi.  Cardiovascular: S1, S2. Regular rate and rhythm. No murmurs, rubs or gallops.   Abdomen: Soft, nontender, nondistended.  Positive bowel sounds. No rebound or guarding.  Neurologic: No focal neurological deficits.   Musculoskeletal: Moves all extremities.  Extremities: No edema.      Results:   Results:    Labs:  Recent Labs   Lab 24  0620   WBC 6.9   HGB 10.4*   MCV 94.6   .0*       Recent Labs   Lab 24  0620   GLU 96   BUN 15   CREATSERUM 1.09   CA 8.5*      K 4.1      CO2 25.0       Estimated Creatinine Clearance: 51.1 mL/min (based on SCr of 1.09 mg/dL).    No results for input(s): \"PTP\", \"INR\" in the last 168 hours.         Culture:  No results found for this visit on 24.    Cardiac  No results for input(s): \"TROP\", \"PBNP\" in the last 168 hours.      Imaging: Imaging data reviewed in Epic.  No results found.    Medications:    losartan  50 mg Oral Daily    metoprolol succinate ER  25 mg Oral Daily    atorvastatin  20 mg Oral Nightly    acetaminophen  1,000 mg Oral q6h         Assessment and Plan:   Assessment & Plan:        Distal Ileum tumor   S/p diagnostic laparoscopy, ileocectomy with primary ileocolonic anastomic    Labs reviewed.    XR abd   Diet per surgery   Pain control  Surgical oncology on consult.   Essential HTN   Losartan, metoprolol   Hyperlipidemia   Lipitor     >35min spent, >50% spent counseling and coordinating care in the form of educating pt/family and d/w consultants and staff. Most of the time spent discussing the above plan.        Plan of care discussed with patient or family at bedside.    Abbey Coffey MD  Hospitalist          Supplementary Documentation:     Quality:  DVT Prophylaxis: SCD  CODE status: Full   Dispo: per clinical course           Estimated date of discharge: TBD  Discharge is dependent on: clinical stability  At this point Mr. Spann is expected to be discharge to: home         **Certification      PHYSICIAN Certification of Need for Inpatient Hospitalization - Initial Certification    Patient will require inpatient services that will reasonably be expected to span two midnight's based on the clinical documentation in H+P.   Based on patients current state of illness, I anticipate that, after discharge, patient will require TBD.

## 2024-07-30 NOTE — CM/SW NOTE
07/30/24 1700   CM/SW Referral Data   Referral Source    Reason for Referral Discharge planning   Informant Patient   Medical Hx   Does patient have an established PCP? Yes  (Josef Boyce)   Patient Info   Patient's Current Mental Status at Time of Assessment Alert;Oriented   Patient's Home Environment House   Patient lives with Spouse/Significant other   Patient Status Prior to Admission   Independent with ADLs and Mobility Yes   Discharge Needs   Anticipated D/C needs No anticipated discharge needs     Pt discussed during nursing rounds. Sx ileum tumor resection on 7/29. Home w/spouse, independent w/o device prior to admission. PT recommending home w/new walker, no other services at [facility]. OT recommending HH w/OT at [place]. Pt declining HH w/OT services despite recommendation. RN ordered new walker from PT dept.     Plan: Home w/spouse pending medical clearance.    / to remain available for support and/or discharge planning.     ALVIN Tang    [phone]

## 2024-07-30 NOTE — PROGRESS NOTES
No acute events overnight  Feels well    Assessment and plan  Lowe discontinued  Pain management  DC by tomorrow      Shalom Horn MD  Complex General Surgical Oncology  [facility]  [email or web address]

## 2024-07-31 VITALS
SYSTOLIC BLOOD PRESSURE: 139 MMHG | TEMPERATURE: 98 F | WEIGHT: 150 LBS | HEIGHT: 72 IN | RESPIRATION RATE: 20 BRPM | HEART RATE: 75 BPM | OXYGEN SATURATION: 100 % | DIASTOLIC BLOOD PRESSURE: 92 MMHG | BODY MASS INDEX: 20.32 KG/M2

## 2024-07-31 LAB
ANION GAP SERPL CALC-SCNC: 4 MMOL/L (ref 0–18)
BUN BLD-MCNC: 12 MG/DL (ref 9–23)
BUN/CREAT SERPL: 10.5 (ref 10–20)
CALCIUM BLD-MCNC: 8.6 MG/DL (ref 8.7–10.4)
CHLORIDE SERPL-SCNC: 114 MMOL/L (ref 98–112)
CO2 SERPL-SCNC: 25 MMOL/L (ref 21–32)
CREAT BLD-MCNC: 1.14 MG/DL
DEPRECATED RDW RBC AUTO: 45.2 FL (ref 35.1–46.3)
EGFRCR SERPLBLD CKD-EPI 2021: 65 ML/MIN/1.73M2 (ref 60–?)
ERYTHROCYTE [DISTWIDTH] IN BLOOD BY AUTOMATED COUNT: 13 % (ref 11–15)
GLUCOSE BLD-MCNC: 90 MG/DL (ref 70–99)
HCT VFR BLD AUTO: 32 %
HGB BLD-MCNC: 10.8 G/DL
MAGNESIUM SERPL-MCNC: 1.7 MG/DL (ref 1.6–2.6)
MCH RBC QN AUTO: 32.3 PG (ref 26–34)
MCHC RBC AUTO-ENTMCNC: 33.8 G/DL (ref 31–37)
MCV RBC AUTO: 95.8 FL
OSMOLALITY SERPL CALC.SUM OF ELEC: 295 MOSM/KG (ref 275–295)
PHOSPHATE SERPL-MCNC: 3.5 MG/DL (ref 2.4–5.1)
PLATELET # BLD AUTO: 131 10(3)UL (ref 150–450)
POTASSIUM SERPL-SCNC: 4.2 MMOL/L (ref 3.5–5.1)
RBC # BLD AUTO: 3.34 X10(6)UL
SODIUM SERPL-SCNC: 143 MMOL/L (ref 136–145)
WBC # BLD AUTO: 7.6 X10(3) UL (ref 4–11)

## 2024-07-31 PROCEDURE — 99232 SBSQ HOSP IP/OBS MODERATE 35: CPT | Performed by: HOSPITALIST

## 2024-07-31 RX ORDER — OXYCODONE HYDROCHLORIDE 5 MG/1
5 TABLET ORAL EVERY 4 HOURS PRN
Qty: 20 TABLET | Refills: 0 | Status: SHIPPED | OUTPATIENT
Start: 2024-07-31

## 2024-07-31 RX ORDER — MAGNESIUM OXIDE 400 MG/1
400 TABLET ORAL ONCE
Status: COMPLETED | OUTPATIENT
Start: 2024-07-31 | End: 2024-07-31

## 2024-07-31 NOTE — DISCHARGE SUMMARY
[facility]  part of [facility]    Discharge Summary    Kwabena Spann Patient Status:  Inpatient    1940 MRN [id number]   Location [facility] 4W/SW/SE Attending Franci Noland MD   Hosp Day # 2 PCP Josef Boyce MD     Date of Admission: 2024   Date of Discharge: 2024    Hospital Discharge Diagnoses: Acute Tubular Adenoma of the colon     Lace+ Score: 43  59-90 High Risk  29-58 Medium Risk  0-28   Low Risk.    TCM Follow-Up Recommendation:  LACE 29-58: Moderate Risk of readmission after discharge from the hospital.        Admitting Diagnosis: Tubular adenoma of colon [D12.6]  S/P right hemicolectomy    Disposition: Home    Discharge Diagnosis: .Active Problems:    Hyperlipidemia    Primary hypertension    S/P right hemicolectomy      Hospital Course:   Reason for Admission:   Per oncology   Kwabena Spann is a 83 year old Male with PMHx AAA s/p repair, HTN, HLD, PAD, CKD who is being referred by Dr. Chung to render an opinion regarding the surgical management of abnormal CT imaging in setting of weight loss and abdominal pain.     Patient presented to GI with abdominal pain for several months and 10 lb weight loss over 2-3 months. He also endorses alternating constipation and diarrhea. CT A/P 3/12 showed mild bowel wall thickening in two section of the distal ileum with a few small soft tissue nodules, with differential including resolving enteritis or carcinoid tumor with adjacent desmoplastic reaction, but no other acute findings to explain abdominal pain. He underwent EGD/CLN showed 2 colon polyps but otherwise normal appearance. 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     Patient presents to discuss surgical options. Prior surgery includes AAA repair and hernia repair. He is on plavix related to his AAA.      Discharge Physical Exam:   Physical Exam:    General: No acute  distress.   Respiratory: Clear to auscultation bilaterally. No wheezes. No rhonchi.  Cardiovascular: S1, S2. Regular rate and rhythm. No murmurs, rubs or gallops.   Abdomen: Soft, nontender, nondistended.  Positive bowel sounds. No rebound or guarding.  Neurologic: No focal neurological deficits.   Musculoskeletal: Moves all extremities.    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   Diet per surgery - low residue   Pain control  Surgical oncology on consult.   Essential HTN   Losartan, metoprolol   Hyperlipidemia   Lipitor      >35min spent, >50% spent counseling and coordinating care in the form of educating pt/family and d/w consultants and staff. Most of the time spent discussing the above plan.     Complications: none    Consultants         Provider   Role Specialty     Jose Donald MD      Consulting Physician HOSPITALIST          Surgical Procedures       Case IDs Date Procedure Surgeon Location Status    [id number] 7/29/24 Diagnostic laparoscopy,  lapascopic assisted right hemicolectomy Shalom Horn MD [facility] MAIN OR Comp          Pending Labs       Order Current Status    Specimen to Pathology Tissue In process            Discharge Plan:   Discharge Condition: Stable    Current Discharge Medication List        New Orders    Details   oxyCODONE 5 MG Oral Tab Take 1 tablet (5 mg total) by mouth every 4 (four) hours as needed.           Home Meds - Unchanged    Details   acetaminophen 500 MG Oral Tab Take 2 tablets (1,000 mg total) by mouth every 6 (six) hours as needed for Pain.      SIMVASTATIN 40 MG Oral Tab TAKE 1 TABLET BY MOUTH EVERY DAY AT NIGHT      LOSARTAN 50 MG Oral Tab TAKE 1 TABLET BY MOUTH EVERY DAY      metoprolol succinate ER 25 MG Oral Tablet 24 Hr Take 1 tablet (25 mg total) by mouth daily.                 Discharge Diet: Low residue diet     Discharge Activity: As tolerated       Discharge  Medications        START taking these medications        Instructions Prescription details   oxyCODONE 5 MG Tabs      Take 1 tablet (5 mg total) by mouth every 4 (four) hours as needed.   Quantity: 20 tablet  Refills: 0            CONTINUE taking these medications        Instructions Prescription details   acetaminophen 500 MG Tabs  Commonly known as: Tylenol Extra Strength      Take 2 tablets (1,000 mg total) by mouth every 6 (six) hours as needed for Pain.   Refills: 0            STOP taking these medications      clopidogrel 75 MG Tabs  Commonly known as: Plavix        GaviLyte-G 236 g Solr  Generic drug: polyethylene glycol (PEG 3350-[id number]-NaCl-NaSulf)        metRONIDAZOLE 500 MG Tabs  Commonly known as: Flagyl        neomycin 500 MG Tabs  Commonly known as: Mycifradin               ASK your doctor about these medications        Instructions Prescription details   losartan 50 MG Tabs  Commonly known as: Cozaar      TAKE 1 TABLET BY MOUTH EVERY DAY   Quantity: 90 tablet  Refills: 0     metoprolol succinate ER 25 MG Tb24  Commonly known as: Toprol XL      Take 1 tablet (25 mg total) by mouth daily.   Refills: 0     simvastatin 40 MG Tabs  Commonly known as: Zocor      TAKE 1 TABLET BY MOUTH EVERY DAY AT NIGHT   Quantity: 90 tablet  Refills: 0               Where to Get Your Medications        These medications were sent to [facility]/pharmacy #[id number] - [facility] [address] [facility] [facility] [address], [phone], [phone]  [address] [place] [id number]      Phone: [phone]   oxyCODONE 5 MG Tabs         Follow up:      Follow-up Information       Shalom Horn MD Follow up.    Specialty: Surgical Oncology  Why: Our office will call you tomorrow to check in and schedule your post operative appointment  Contact information:  [address] [place] [address] [phone]                             Follow up Labs and imaging:         Other Discharge Instructions:         Colon Surgery  Discharge Instructions  Thank you for choosing the Surgical Oncology team at [facility].    Managing Your Pain  Follow the guidelines below to help manage your pain at home:  Take your medications as directed and as needed. You may also take 1000 mg of acetaminophen (Tylenol®) every 6 hours as needed for pain.  Call our office if the medication prescribed for you does not ease your pain.  Don't drive or drink alcohol while you're taking prescription pain medications.  Take enough medication to do your recommended exercises comfortably. However, it is normal for your pain to increase a little as you start to be more active.  Keep track of when you take your pain medication. It works best 30 to 45 minutes after you take it.   Pain medication may cause constipation  Changes in Bowel Function  When part of your colon is removed, the part that is left adapts to the change shortly after surgery. During this time, you may have gas, cramps, or changes in your bowel habits (diarrhea or frequent bowel movements). These changes may take weeks or months to go away.   Managing gas:   Avoid foods that can cause gas. Examples are beans, broccoli, onions, and cauliflower.   Managing diarrhea:   Drink at least 8 to 10 (8 oz) glasses of liquids each day if you are having diarrhea. You can drink water, broth, or sports drinks. This will help prevent dehydration.  Follow the BRAT diet (bananas, rice, applesauce, toast)  Contact our office if you have diarrhea more than 4 times a day   Managing constipation:   Go to the bathroom at the same time every day. Try to use the bathroom within 5-30 minutes after meals  Drink plenty of water  Ask your doctor or nurse which over-the-counter or prescription medications are best for you.   Contact our office if you haven't had a bowel movement in 3 days.  Caring for Your Incision  It's normal for the skin below your incision to feel numb. This happens because some of the nerves were  cut during your surgery. The numbness will go away over time.  The staples in your incision will be removed in the office after surgery. This is usually about 2 weeks after discharge.   Leave the dressing on for 48 hours after surgery. The clear outer dressing (Tegaderm) can then come off. You may leave the incision uncovered, unless there is drainage.   If you go home with Steri-Strips (small white adhesive strips) on your incision, they will loosen and fall off by themselves. If they haven't fallen off within 14 days, you can take them off.  If the area around your incision is red, puffy, or if you have any drainage from your incision, contact our office.  Showering  You may shower 48 hours after surgery. When you shower, use soap to gently wash your incision. After you shower, pat the area dry with a clean towel. Don't rub over your incision. Leave your incision uncovered, unless there's drainage.  Avoid tub baths until your surgeon says it's okay.  Eating and Drinking  Continue a low residue/soft diet for 4 weeks from the date of surgery. Foods to stay away from include foods high in fiber, corn, broccoli, unpeeled fruits, raw vegetables, nuts, seeds, whole wheat pasta/breads, spicy foods, fried foods, overly seasoned foods. If a particular food makes you feel unwell, stop eating it and try it again 2 to 3 weeks later. Finding foods that are best for you may require some trial and error.   Drink plenty of liquids. Try to drink 8 (8-ounce) glasses of liquids every day. Avoid alcohol until you check with your surgeon.  Activity and Exercise  Remember, recovery after surgery takes several weeks. It is common to feel tired or fatigued. Rest as needed.   Doing aerobic exercise, such as walking and stair climbing, will help you gain strength and feel better. Rest or stop as needed.  Don't lift anything heavier than 10 pounds for at least 8 weeks after your surgery or until your surgeon says it's okay.   Avoid  strenuous activity and exercises until your surgeon says it's okay.   Ask your surgeon when you can return to work.  When to Call:  Contact our office if you experience the following symptoms:  Fever of 100.4° F (38°C) or higher  Cloudy or smelly drainage from the incision site  The skin around your incision is warm/red/swollen  Sudden increase in pain or new pain  Shaking chills  Fast pulse  Shortness of breath or chest pain  Nausea or vomiting.   Diarrhea  Constipation that isn't relieved in 3 days  Signs of a bladder infection (urinating more often than usual, burning while urinating, bleeding or hesitancy while urinating).  Any new or unexplained symptoms    We will call you to set up a follow-up appointment.    Please arrive at the following location:  [facility]: [address] [place] [address]  Please call us at [phone] if you are unable to make it to your appointment or if you have any questions.                    Time spent:  > 30 minutes    ZAIRA PABON MD  7/31/2024

## 2024-07-31 NOTE — PLAN OF CARE
Problem: Patient Centered Care  Goal: Patient preferences are identified and integrated in the patient's plan of care  Description: Interventions:  - What would you like us to know as we care for you? Discharge home with wife  - Provide timely, complete, and accurate information to patient/family  - Incorporate patient and family knowledge, values, beliefs, and cultural backgrounds into the planning and delivery of care  - Encourage patient/family to participate in care and decision-making at the level they choose  - Honor patient and family perspectives and choices  Outcome: Progressing     Problem: Patient/Family Goals  Goal: Patient/Family Long Term Goal  Description: Patient's Long Term Goal: Discharge home  Interventions:  - XR Abdomen  - Pain control  - Tolerate diet  -Ambulating safely  - Walker per physical therapy recommendations  - See additional Care Plan goals for specific interventions  Outcome: Progressing  Goal: Patient/Family Short Term Goal  Description: Patient's Short Term Goal: Free from urine retention    Interventions:   - Encourage ambulation  - Straight cath per protocol  - Monitor intake and output  - Bladder Scan  - Toilet schedule  - See additional Care Plan goals for specific interventions  Outcome: Progressing     Problem: PAIN - ADULT  Goal: Verbalizes/displays adequate comfort level or patient's stated pain goal  Description: INTERVENTIONS:  - Encourage pt to monitor pain and request assistance  - Assess pain using appropriate pain scale  - Administer analgesics based on type and severity of pain and evaluate response  - Implement non-pharmacological measures as appropriate and evaluate response  - Consider cultural and social influences on pain and pain management  - Manage/alleviate anxiety  - Utilize distraction and/or relaxation techniques  - Monitor for opioid side effects  - Notify MD/LIP if interventions unsuccessful or patient reports new pain  - Anticipate increased pain  with activity and pre-medicate as appropriate  Outcome: Progressing     Problem: SAFETY ADULT - FALL  Goal: Free from fall injury  Description: INTERVENTIONS:  - Assess pt frequently for physical needs  - Identify cognitive and physical deficits and behaviors that affect risk of falls.  - [place] fall precautions as indicated by assessment.  - Educate pt/family on patient safety including physical limitations  - Instruct pt to call for assistance with activity based on assessment  - Modify environment to reduce risk of injury  - Provide assistive devices as appropriate  - Consider OT/PT consult to assist with strengthening/mobility  - Encourage toileting schedule  Outcome: Progressing     Kwabena is aware of his plan of care. Patient is alert and oriented x4, room air. Pain controlled with sched Tylenol and PRN Oxy. Denies any nausea. Surgical dressing in place. Voids WNL. Up with assist and walker. Safety measures in place, call light within reach, will continue to monitor.

## 2024-07-31 NOTE — PLAN OF CARE
Vitals within normal limits. Room Air. Ambulating with assist and walker.   Tolerating diet. Denies nausea or vomiting.  Denies pain.  Did stairs with physical therapy.   Discharge instructions and follow ups discussed with patient, he verbalized understanding. Highlighted contact info and key points on discharge paperwork for reference. Plan to restart plavix on Monday August 5th, patient aware, verbalized this to wife at time of discharge. Both are agreeable to plan. Surgery to follow up tomorrow for post-op visit.   IV discontinued.        Problem: Patient Centered Care  Goal: Patient preferences are identified and integrated in the patient's plan of care  Description: Interventions:  - What would you like us to know as we care for you? Discharge home with wife  - Provide timely, complete, and accurate information to patient/family  - Incorporate patient and family knowledge, values, beliefs, and cultural backgrounds into the planning and delivery of care  - Encourage patient/family to participate in care and decision-making at the level they choose  - Honor patient and family perspectives and choices  Outcome: Adequate for Discharge     Problem: Patient/Family Goals  Goal: Patient/Family Long Term Goal  Description: Patient's Long Term Goal: Discharge home  Interventions:  - XR Abdomen  - Pain control  - Tolerate diet  -Ambulating safely  - Walker per physical therapy recommendations  - See additional Care Plan goals for specific interventions  Outcome: Adequate for Discharge  Goal: Patient/Family Short Term Goal  Description: Patient's Short Term Goal: Free from urine retention    Interventions:   - Encourage ambulation  - Straight cath per protocol  - Monitor intake and output  - Bladder Scan  - Toilet schedule  - See additional Care Plan goals for specific interventions  Outcome: Adequate for Discharge     Problem: PAIN - ADULT  Goal: Verbalizes/displays adequate comfort level or patient's stated pain  goal  Description: INTERVENTIONS:  - Encourage pt to monitor pain and request assistance  - Assess pain using appropriate pain scale  - Administer analgesics based on type and severity of pain and evaluate response  - Implement non-pharmacological measures as appropriate and evaluate response  - Consider cultural and social influences on pain and pain management  - Manage/alleviate anxiety  - Utilize distraction and/or relaxation techniques  - Monitor for opioid side effects  - Notify MD/LIP if interventions unsuccessful or patient reports new pain  - Anticipate increased pain with activity and pre-medicate as appropriate  Outcome: Adequate for Discharge     Problem: SAFETY ADULT - FALL  Goal: Free from fall injury  Description: INTERVENTIONS:  - Assess pt frequently for physical needs  - Identify cognitive and physical deficits and behaviors that affect risk of falls.  - [place] fall precautions as indicated by assessment.  - Educate pt/family on patient safety including physical limitations  - Instruct pt to call for assistance with activity based on assessment  - Modify environment to reduce risk of injury  - Provide assistive devices as appropriate  - Consider OT/PT consult to assist with strengthening/mobility  - Encourage toileting schedule  Outcome: Adequate for Discharge

## 2024-07-31 NOTE — DISCHARGE INSTRUCTIONS
Colon Surgery Discharge Instructions  Thank you for choosing the Surgical Oncology team at [facility].    **Please continue to hold your plavix. You can restart it on Monday 8/5, 1 week after surgery**    Managing Your Pain  Follow the guidelines below to help manage your pain at home:  Take your medications as directed and as needed. You may also take 1000 mg of acetaminophen (Tylenol®) every 6 hours as needed for pain.  Call our office if the medication prescribed for you does not ease your pain.  Don't drive or drink alcohol while you're taking prescription pain medications.  Take enough medication to do your recommended exercises comfortably. However, it is normal for your pain to increase a little as you start to be more active.  Keep track of when you take your pain medication. It works best 30 to 45 minutes after you take it.   Pain medication may cause constipation  Changes in Bowel Function  When part of your colon is removed, the part that is left adapts to the change shortly after surgery. During this time, you may have gas, cramps, or changes in your bowel habits (diarrhea or frequent bowel movements). These changes may take weeks or months to go away.   Managing gas:   Avoid foods that can cause gas. Examples are beans, broccoli, onions, and cauliflower.   Managing diarrhea:   Drink at least 8 to 10 (8 oz) glasses of liquids each day if you are having diarrhea. You can drink water, broth, or sports drinks. This will help prevent dehydration.  Follow the BRAT diet (bananas, rice, applesauce, toast)  Contact our office if you have diarrhea more than 4 times a day   Managing constipation:   Go to the bathroom at the same time every day. Try to use the bathroom within 5-30 minutes after meals  Drink plenty of water  Ask your doctor or nurse which over-the-counter or prescription medications are best for you.   Contact our office if you haven't had a bowel movement in 3 days.  Caring for Your  Incision  It's normal for the skin below your incision to feel numb. This happens because some of the nerves were cut during your surgery. The numbness will go away over time.  The staples in your incision will be removed in the office after surgery. This is usually about 2 weeks after discharge.   Leave the dressing on for 48 hours after surgery. The clear outer dressing (Tegaderm) can then come off. You may leave the incision uncovered, unless there is drainage.   If you go home with Steri-Strips (small white adhesive strips) on your incision, they will loosen and fall off by themselves. If they haven't fallen off within 14 days, you can take them off.  If the area around your incision is red, puffy, or if you have any drainage from your incision, contact our office.  Showering  You may shower 48 hours after surgery. When you shower, use soap to gently wash your incision. After you shower, pat the area dry with a clean towel. Don't rub over your incision. Leave your incision uncovered, unless there's drainage.  Avoid tub baths until your surgeon says it's okay.  Eating and Drinking  Continue a low residue/soft diet for 4 weeks from the date of surgery. Foods to stay away from include foods high in fiber, corn, broccoli, unpeeled fruits, raw vegetables, nuts, seeds, whole wheat pasta/breads, spicy foods, fried foods, overly seasoned foods. If a particular food makes you feel unwell, stop eating it and try it again 2 to 3 weeks later. Finding foods that are best for you may require some trial and error.   Drink plenty of liquids. Try to drink 8 (8-ounce) glasses of liquids every day. Avoid alcohol until you check with your surgeon.  Activity and Exercise  Remember, recovery after surgery takes several weeks. It is common to feel tired or fatigued. Rest as needed.   Doing aerobic exercise, such as walking and stair climbing, will help you gain strength and feel better. Rest or stop as needed.  Don't lift anything  heavier than 10 pounds for at least 8 weeks after your surgery or until your surgeon says it's okay.   Avoid strenuous activity and exercises until your surgeon says it's okay.   Ask your surgeon when you can return to work.  When to Call:  Contact our office if you experience the following symptoms:  Fever of 100.4° F (38°C) or higher  Cloudy or smelly drainage from the incision site  The skin around your incision is warm/red/swollen  Sudden increase in pain or new pain  Shaking chills  Fast pulse  Shortness of breath or chest pain  Nausea or vomiting.   Diarrhea  Constipation that isn't relieved in 3 days  Signs of a bladder infection (urinating more often than usual, burning while urinating, bleeding or hesitancy while urinating).  Any new or unexplained symptoms    We will call you to set up a follow-up appointment.    Please arrive at the following location:  [facility]: [address] [place] [address]  Please call us at [phone] if you are unable to make it to your appointment or if you have any questions.

## 2024-07-31 NOTE — PHYSICAL THERAPY NOTE
PHYSICAL THERAPY TREATMENT NOTE - INPATIENT     Room Number: 447B/447-B       Presenting Problem: s/p hemicolectomy hyperlipidemia  Co-Morbidities : leg cramps, PVD, MVR    Problem List  Active Problems:    Hyperlipidemia    Primary hypertension    S/P right hemicolectomy      PHYSICAL THERAPY ASSESSMENT   Patient demonstrates good  progress this session, goals  remain in progress.      Patient is requiring stand-by assist and contact guard assist as a result of the following impairments: decreased functional strength, decreased endurance/aerobic capacity, and medical status.     Patient continues to function below baseline with bed mobility, transfers, gait, stair negotiation, and standing prolonged periods. Next session anticipate patient to progress bed mobility, transfers, gait, stair negotiation, and standing prolonged periods.  Physical Therapy will continue to follow patient for duration of hospitalization.    Patient continues to benefit from continued skilled PT services: for duration of hospitalization. Would benefit from additional family support at [place].     PLAN  PT Treatment Plan: Bed mobility;Endurance;Energy conservation;Patient education;Gait training;Strengthening;Stair training;Transfer training;Balance training  Frequency (Obs): Daily    SUBJECTIVE  \"I'm retired\"    OBJECTIVE  Precautions: Abdominal protective strategies    PAIN ASSESSMENT   Ratin  Location: abd surgical site with movement  Management Techniques: Activity promotion;Body mechanics;Breathing techniques;Relaxation;Repositioning    BALANCE  Static Sitting: Fair +  Dynamic Sitting: Fair  Static Standing: Fair -  Dynamic Standing: Fair -    ACTIVITY TOLERANCE  Pulse: 75  Heart Rate Source: Monitor    AM-PAC '6-Clicks' INPATIENT SHORT FORM - BASIC MOBILITY  How much difficulty does the patient currently have...  Patient Difficulty: Turning over in bed (including adjusting bedclothes, sheets and blankets)?: None   Patient Difficulty:  Sitting down on and standing up from a chair with arms (e.g., wheelchair, bedside commode, etc.): None   Patient Difficulty: Moving from lying on back to sitting on the side of the bed?: None   How much help from another person does the patient currently need...   Help from Another: Moving to and from a bed to a chair (including a wheelchair)?: A Little   Help from Another: Need to walk in hospital room?: A Little   Help from Another: Climbing 3-5 steps with a railing?: A Little     AM-PAC Score:  Raw Score: 21   Approx Degree of Impairment: 28.97%   Standardized Score (AM-PAC Scale): 50.25   CMS Modifier (G-Code): CJ    FUNCTIONAL ABILITY STATUS  Functional Mobility/Gait Assessment  Gait Assistance: Supervision  Distance (ft): 250 ft  Assistive Device: Rolling walker  Pattern:  (decreased vasquez/speed, short step length bilaterally, forward flexed posture, narrow TASIA). Educated pt to spread feet apart to encourage wider TASIA. Cued for upright posture for postural management.   Stairs: Stairs  How Many Stairs: 4  Device: 2 Rails  Assist:  (CGA to SBA)  Pattern: Ascend and Descend  Ascend and Descend : Step to  Rolling: stand-by assist. Cued for log roll. Increased time to complete.   Sit to Supine: stand-by assist. Increased time to complete. Pt tolerated 2 minutes of static sitting with bilateral UE support and SBA to maintain seated balance.   Sit to Stand: stand-by assist. Cued for proper sequencing with RW for pt safety. Pt tolerated 2 minutes of static standing with support from RW and SBA to maintain balance.     Pt received lying supine in bed. RN approved activity. Pt agreeable to participation. Reviewed log roll technique and demonstrated to pt. Educated on proper stair negotiation, pt acknowledges understanding.     The patient's Approx Degree of Impairment: 28.97% has been calculated based on documentation in the [facility] '6 clicks' Inpatient Daily Activity Short Form.  Research supports that patients with  this level of impairment may benefit from home with family support.  Final disposition will be made by interdisciplinary medical team.    Patient End of Session: Up in chair;Needs met;Call light within reach;RN aware of session/findings;All patient questions and concerns addressed    CURRENT GOALS   Goals to be met by: 8/15/2024  Patient Goal Patient's self-stated goal is: Return home    Goal #1 Patient is able to demonstrate supine - sit EOB @ level: independent      Goal #1   Current Status  In progress, SBA   Goal #2 Patient is able to demonstrate transfers Sit to/from Stand at assistance level: modified independent with walker - rolling      Goal #2  Current Status  In progress, SBA with RW   Goal #3 Patient is able to ambulate 300 feet with assist device: walker - rolling at assistance level: modified independent   Goal #3   Current Status  In progress, 250 ft with RW and SBA   Goal #4 Patient will negotiate 6 stairs/one curb w/ assistive device and supervision   Goal #4   Current Status  In progress, 4 stairs CGA   Goal #5 Patient to demonstrate independence with home activity/exercise instructions provided to patient in preparation for discharge.   Goal #5   Current Status  In progress     Gait Training: 15 minutes  Therapeutic Activity: 10 minutes    Saravanan [facility]  DPT Student     I provided clinical instruction and supervision for the duration of this session and agree with the above documentation.     Charis Gann, PT, DPT

## 2024-08-01 ENCOUNTER — TELEPHONE (OUTPATIENT)
Dept: SURGERY | Facility: CLINIC | Age: 84
End: 2024-08-01

## 2024-08-01 ENCOUNTER — TELEPHONE (OUTPATIENT)
Dept: INTERNAL MEDICINE CLINIC | Facility: CLINIC | Age: 84
End: 2024-08-01

## 2024-08-01 ENCOUNTER — PATIENT OUTREACH (OUTPATIENT)
Dept: CASE MANAGEMENT | Age: 84
End: 2024-08-01

## 2024-08-01 DIAGNOSIS — Z02.9 ENCOUNTERS FOR ADMINISTRATIVE PURPOSE: ICD-10-CM

## 2024-08-01 DIAGNOSIS — Z90.49 S/P RIGHT HEMICOLECTOMY: Primary | ICD-10-CM

## 2024-08-01 PROCEDURE — 1111F DSCHRG MED/CURRENT MED MERGE: CPT

## 2024-08-01 RX ORDER — POLYETHYLENE GLYCOL 3350 17 G/17G
17 POWDER, FOR SOLUTION ORAL DAILY
COMMUNITY

## 2024-08-01 NOTE — TELEPHONE ENCOUNTER
EVANGELISTA, Spoke to patient for TCM today.  Patient states that he is doing well and did not need a follow up with PCP at this time as he is seeing Dr. Swanson on 8/13/24.  Per guidelines patient should  be seen within seven days by 8/7/24.     Otherwise, last date for TCM: 8/14/24

## 2024-08-01 NOTE — PROGRESS NOTES
Initial Post Discharge Follow Up   Discharge Date: 7/31/24  Contact Date: 8/1/2024    Consent Verification:  Assessment Completed With: Patient  HIPAA Verified?  Yes    Discharge Dx:   Acute Tubular Adenoma of the colon     General:   How have you been since your discharge from the hospital? Everything seems to be okay. I have some pain around the surgery part. It's the natural pain from surgery. .  Do you have any pain since discharge?    Where: surgical incisions   Rating on pain scale 1-10, 10 being the worst pain you have ever experienced, what is current pain: 3  Alleviating factors: rest  Aggravating factors: activity  Is the pain manageable at home? Yes  How well was your pain managed while in the hospital?   On a scale of 1-5   1- Very Poor and 5- Very well   Very Well  When you were leaving the hospital were your discharge instructions reviewed with you? Yes  How well were your discharge instructions explained to you?   On a scale of 1-5   1- Very Poor and 5- Very well   Very Well  Do you have any questions about your discharge instructions?  No  Before leaving the hospital was your diagnoses explained to you? Yes  Do you have any questions about your diagnoses? No  Are you able to perform normal daily activities of living as you have prior to your hospital stay (dressing, bathing, ambulating to the bathroom, etc)? yes  (NCM) Was patient given a different diet per AVS? yes  If so, which diet?  Low residue.   Are there any barriers to following this diet? no    Medications:   Current Outpatient Medications   Medication Sig Dispense Refill    oxyCODONE 5 MG Oral Tab Take 1 tablet (5 mg total) by mouth every 4 (four) hours as needed. 20 tablet 0    acetaminophen 500 MG Oral Tab Take 2 tablets (1,000 mg total) by mouth every 6 (six) hours as needed for Pain.      SIMVASTATIN 40 MG Oral Tab TAKE 1 TABLET BY MOUTH EVERY DAY AT NIGHT 90 tablet 0    LOSARTAN 50 MG Oral Tab TAKE 1 TABLET BY MOUTH EVERY DAY 90 tablet  0    metoprolol succinate ER 25 MG Oral Tablet 24 Hr Take 1 tablet (25 mg total) by mouth daily.       Were there any changes to your current medication(s) noted on the AVS? Yes  If so, were these medication changes discussed with you prior to leaving the hospital? Yes, pt restarting Plavix on 8/5/24  If a new medication was prescribed:    Was the new medication's purpose & side effects reviewed? Yes  Do you have any questions about your new medication? No  Did you  your discharge medications when you left the hospital? Yes  Let's go over your medications together to make sure we are not missing anything. Medications Reviewed  Are there any reasons that keep you from taking your medication as prescribed? No  Are you having any concerns with constipation? Yes      Discharge medications reviewed/discussed/and reconciled against outpatient medications with patient.  Any changes or updates to medications sent to PCP.  Patient Acknowledged     Referrals/orders at D/C:  Referrals/orders placed at D/C? no    DME ordered at D/C? No      Discharge orders, AVS reviewed and discussed with patient. Any changes or updates to orders sent to PCP.  Patient Acknowledged      SDOH:   Transportation Needs: No Transportation Needs (7/29/2024)    Transportation Needs     Lack of Transportation: No     Car Seat: Not on file     Financial Resource Strain: Low Risk  (8/1/2024)    Financial Resource Strain     Difficulty of Paying Living Expenses: Not hard at all     Med Affordability: No       Follow up appointments:      Your appointments       Date & Time Appointment Department (Center)    Aug 13, 2024 10:45 AM CDT Post Op Visit with Shalom Horn MD [facility] ([facility] [place])        Aug 29, 2024 11:00 AM CDT Follow Up Visit with Clarita Parr APRN [facility] ([facility])              [facility]  Group, [facility]  [facility] [place]  [address]  [facility] [address] [phone] [facility], [place] [address] [address]  [facility] [address]  [phone]            TCC  Was TCC ordered: No      PCP (If no TCC appointment)  Does patient already have a PCP appointment scheduled? No  NCM Attempted to schedule PCP office TCM appointment with patient   If no appointment scheduled: Explain-pt declined as he is following up with surg/onc. NCM sent TE to PCP office.     Specialist    Does the patient have any other follow up appointment(s) needing to be scheduled? Yes  If yes: NCM reviewed upcoming specialist appointment with patient: Yes  Does the patient need assistance scheduling appointment(s): No, pt already has his post op scheduled for 8/13    Is there any reason as to why you cannot make your appointment(s)?  No     Needs post D/C:   Now that you are home, are there any needs or concerns you need addressed before your next visit with your PCP?  (DME, meds, questions, etc.): No    Interventions by NCM:   NCM reviewed discharge instructions and when to seek medical attention with the patient. He states that he is doing pretty good so far and feels he is improving. NCM instructed on s/s of infection; he v/u. He states that he s/w Dr. Swanson's nurse today and reported constipation. He was instructed to take Miralax and call back in a couple days should he not have a BM. He denies having any nausea or vomiting. He denied having any fever, sob, lightheadedness, HA or any other new or worsening symptoms. Med review completed. He denied having any questions or concerns at this time.       CCM referral placed:    No    BOOK BY DATE: 8/14/24

## 2024-08-01 NOTE — TELEPHONE ENCOUNTER
Called to check in with patient after discharge from hospital yesterday. Spoke with patient and his wife Malissa. Pain is well controlled with oxycodone.  Appetite is low, but tolerating soft diet without n/v. +flatus, no BM since surgery. Recommedn starting miralax tomorrow if no BM by then and to call our office if no Bm in 72 hours. No concerns with incisions.     Pathology pending    Post op appointment scheduled for Tuesday August 13 at 10:45a at [place]. Arrival instructions discussed. Patient knows to contact our office with questions/concerns if they arise prior to that appointment.    Caitlin Oliva PA-C  Department of Surgical Oncology  [place] [address] [facility] [address] [place] [address]  [facility]  [address] [place]  [address]  T: [phone]  F: [phone]

## 2024-08-05 ENCOUNTER — TELEPHONE (OUTPATIENT)
Dept: SURGERY | Facility: CLINIC | Age: 84
End: 2024-08-05

## 2024-08-05 NOTE — TELEPHONE ENCOUNTER
Spoke to patient's wife stating patient experiencing diarrhea x 3 and once this morning with dizziness. Denies dizziness now and reports patient improved. Advised patient to be see in clinic tomorrow. Patient scheduled for 8/6 at 9:45 am with Dr. Horn. Advised patient to go to ED for worsening symptoms; including dizziness, lethargy, low BP. All questions answered, advised to call back for any future questions or concerns. Patient verbalized understanding.

## 2024-08-06 ENCOUNTER — OFFICE VISIT (OUTPATIENT)
Dept: SURGERY | Facility: CLINIC | Age: 84
End: 2024-08-06
Payer: MEDICARE

## 2024-08-06 VITALS
BODY MASS INDEX: 19 KG/M2 | DIASTOLIC BLOOD PRESSURE: 80 MMHG | SYSTOLIC BLOOD PRESSURE: 127 MMHG | TEMPERATURE: 98 F | HEART RATE: 74 BPM | RESPIRATION RATE: 20 BRPM | WEIGHT: 140.63 LBS

## 2024-08-06 DIAGNOSIS — R19.7 DIARRHEA, UNSPECIFIED TYPE: ICD-10-CM

## 2024-08-06 DIAGNOSIS — Z90.49 S/P RIGHT HEMICOLECTOMY: Primary | ICD-10-CM

## 2024-08-06 LAB
ALBUMIN SERPL-MCNC: 3.9 G/DL (ref 3.2–4.8)
ALBUMIN/GLOB SERPL: 1.6 {RATIO} (ref 1–2)
ALP LIVER SERPL-CCNC: 62 U/L
ALT SERPL-CCNC: 20 U/L
ANION GAP SERPL CALC-SCNC: 5 MMOL/L (ref 0–18)
AST SERPL-CCNC: 21 U/L (ref ?–34)
BILIRUB SERPL-MCNC: 0.4 MG/DL (ref 0.2–1.1)
BUN BLD-MCNC: 15 MG/DL (ref 9–23)
CALCIUM BLD-MCNC: 9.3 MG/DL (ref 8.7–10.4)
CHLORIDE SERPL-SCNC: 110 MMOL/L (ref 98–112)
CO2 SERPL-SCNC: 26 MMOL/L (ref 21–32)
CREAT BLD-MCNC: 1.27 MG/DL
DEPRECATED RDW RBC AUTO: 46.5 FL (ref 35.1–46.3)
EGFRCR SERPLBLD CKD-EPI 2021: 57 ML/MIN/1.73M2 (ref 60–?)
ERYTHROCYTE [DISTWIDTH] IN BLOOD BY AUTOMATED COUNT: 13.1 % (ref 11–15)
GLOBULIN PLAS-MCNC: 2.4 G/DL (ref 2–3.5)
GLUCOSE BLD-MCNC: 92 MG/DL (ref 70–99)
HCT VFR BLD AUTO: 35.2 %
HGB BLD-MCNC: 11.6 G/DL
MCH RBC QN AUTO: 31.9 PG (ref 26–34)
MCHC RBC AUTO-ENTMCNC: 33 G/DL (ref 31–37)
MCV RBC AUTO: 96.7 FL
OSMOLALITY SERPL CALC.SUM OF ELEC: 292 MOSM/KG (ref 275–295)
PLATELET # BLD AUTO: 210 10(3)UL (ref 150–450)
POTASSIUM SERPL-SCNC: 3.8 MMOL/L (ref 3.5–5.1)
PROT SERPL-MCNC: 6.3 G/DL (ref 5.7–8.2)
RBC # BLD AUTO: 3.64 X10(6)UL
SODIUM SERPL-SCNC: 141 MMOL/L (ref 136–145)
WBC # BLD AUTO: 9.1 X10(3) UL (ref 4–11)

## 2024-08-06 PROCEDURE — 80053 COMPREHEN METABOLIC PANEL: CPT | Performed by: PHYSICIAN ASSISTANT

## 2024-08-06 PROCEDURE — 85027 COMPLETE CBC AUTOMATED: CPT | Performed by: PHYSICIAN ASSISTANT

## 2024-08-06 PROCEDURE — 36415 COLL VENOUS BLD VENIPUNCTURE: CPT | Performed by: PHYSICIAN ASSISTANT

## 2024-08-06 PROCEDURE — 99024 POSTOP FOLLOW-UP VISIT: CPT | Performed by: SURGERY

## 2024-08-06 RX ORDER — LOPERAMIDE HYDROCHLORIDE 2 MG/1
2 TABLET ORAL AS NEEDED
Qty: 20 TABLET | Refills: 0 | Status: SHIPPED | OUTPATIENT
Start: 2024-08-06

## 2024-08-06 RX ORDER — CHOLESTYRAMINE LIGHT 4 G/5.7G
4 POWDER, FOR SUSPENSION ORAL DAILY
Qty: 30 PACKET | Refills: 0 | Status: SHIPPED | OUTPATIENT
Start: 2024-08-06 | End: 2024-09-05

## 2024-08-06 NOTE — PATIENT INSTRUCTIONS
Your labs looked good today - no need for IV fluids.    Please take cholestyramine (questran) daily for diarrhea. If you are still having watery bowel movements after that, please take 1 tab of imodium after your third bowel movement and an additional tab with each subsequent bowel movement to a maximum of 8 in 1 day.

## 2024-08-07 NOTE — PROGRESS NOTES
[facility]    Patient Name:  Kwabena Spann   YOB: 1940   Gender:  Male   Appt Date:  8/6/2024   Provider:  Shlaom Horn MD   Insurance:  MEDICARE PART B ONLY     PATIENT PROVIDERS  Referring Provider: No ref. provider found   Address: No referring provider defined for this encounter.   Phone #: N/A    Primary Care Provider:Josef Boyce MD   Address: [address] [place] [address]   Phone #: [phone]       CHIEF COMPLAINT  Chief Complaint   Patient presents with    Post-Op        PROBLEMS  Reviewed   Patient Active Problem List   Diagnosis    Abdominal aortic aneurysm (AAA) without rupture (HCC)    Hyperlipidemia    Peripheral vascular disease (HCC)    Tobacco use disorder    Leg cramps    Dysequilibrium    Stage 3 chronic kidney disease (HCC)    Primary hypertension    Nonrheumatic mitral valve regurgitation    S/P right hemicolectomy        History of Present Illness:  Kwabena Spann is a 83 year old Male with PMHx AAA s/p repair, HTN, HLD, PAD, CKD is following up after right hemicolectomy.    Patient presented to GI with abdominal pain for several months and 10 lb weight loss over 2-3 months. He also endorses alternating constipation and diarrhea. CT A/P 3/12 showed mild bowel wall thickening in two section of the distal ileum with a few small soft tissue nodules, with differential including resolving enteritis or carcinoid tumor with adjacent desmoplastic reaction, but no other acute findings to explain abdominal pain. He underwent EGD/CLN showed 2 colon polyps but otherwise normal appearance. 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 On 7/29 he underwent diagnostic laparoscopy with intraoperative findings concerning for malignancy so decision made to resect. He had ileocecectomy with primary anastomosis. Pathology returned to show no evidence of  malignancy- ulceration and extensive inflammation, granulation formamtion and submucosal hemorrhage that could be consistent with a chronic inflammatory process, such as IBD.    He returns for a post operative visit. He has had 3-4 watery bowel movements daily. He is eating without nausea or vomiting, but low appetite feels everything \"goes right through\" with the diarrhea. 10lb weight loss since surgery. Denies fever, chills, concerns with incision.     Vital Signs:  /80 (BP Location: Left arm, Patient Position: Sitting, Cuff Size: adult)   Pulse 74   Temp 97.7 °F (36.5 °C) (Temporal)   Resp 20   Wt 63.8 kg (140 lb 9.6 oz)   BMI 19.07 kg/m²      Medications Reviewed:    Current Outpatient Medications:     cholestyramine light 4 g Oral Powd Pack, Take 1 packet (4 g total) by mouth daily., Disp: 30 packet, Rfl: 0    Loperamide HCl 2 MG Oral Tab, Take 1 tablet (2 mg total) by mouth as needed for Diarrhea., Disp: 20 tablet, Rfl: 0    polyethylene glycol, PEG 3350, 17 g Oral Powd Pack, Take 17 g by mouth daily., Disp: , Rfl:     oxyCODONE 5 MG Oral Tab, Take 1 tablet (5 mg total) by mouth every 4 (four) hours as needed., Disp: 20 tablet, Rfl: 0    acetaminophen 500 MG Oral Tab, Take 2 tablets (1,000 mg total) by mouth every 6 (six) hours as needed for Pain., Disp: , Rfl:     SIMVASTATIN 40 MG Oral Tab, TAKE 1 TABLET BY MOUTH EVERY DAY AT NIGHT, Disp: 90 tablet, Rfl: 0    LOSARTAN 50 MG Oral Tab, TAKE 1 TABLET BY MOUTH EVERY DAY, Disp: 90 tablet, Rfl: 0    metoprolol succinate ER 25 MG Oral Tablet 24 Hr, Take 1 tablet (25 mg total) by mouth daily., Disp: , Rfl:      Allergies Reviewed:  No Known Allergies     History:  Reviewed:  Past Medical History:    Deep vein thrombosis (HCC)    High blood pressure    High cholesterol    History of repair of aneurysm of abdominal aorta using endovascular stent graft    Hyperlipidemia    Renal disorder    SBO (small bowel obstruction) (HCC)      Reviewed:  Past Surgical  History:   Procedure Laterality Date    Colonoscopy N/A 4/30/2024    Procedure: COLONOSCOPY;  Surgeon: Wali Chung MD;  Location: [facility]COPY    Cyst removal      Endovascular repair, infrarenal abdominal aortic aneury      endovascular stent graft    Hernia surgery        Reviewed Social History:  Social History     Socioeconomic History    Marital status:    Tobacco Use    Smoking status: Former     Types: Cigarettes     Start date: 4/20/1964    Smokeless tobacco: Never    Tobacco comments:     4-6    Vaping Use    Vaping status: Never Used   Substance and Sexual Activity    Alcohol use: Yes     Comment: 1 shot a day    Drug use: Never   Other Topics Concern    Weight Concern No      Reviewed:  History reviewed. No pertinent family history.     Review of Systems:  Review of Systems   Constitutional:  Positive for appetite change and unexpected weight change. Negative for fatigue.   HENT: Negative.     Eyes: Negative.    Respiratory:  Negative for shortness of breath.    Cardiovascular:  Negative for chest pain.   Gastrointestinal:  Positive for abdominal pain and diarrhea. Negative for abdominal distention, constipation, nausea and vomiting.   Endocrine: Negative.    Genitourinary: Negative.    Musculoskeletal:  Negative for back pain.   Skin:  Negative for rash and wound.   Neurological:  Negative for weakness.   Hematological:  Negative for adenopathy.   Psychiatric/Behavioral: Negative.          Physical Examination:  Physical Exam  Constitutional:       General: He is not in acute distress.     Appearance: He is well-developed.   HENT:      Head: Normocephalic and atraumatic.   Eyes:      General: No scleral icterus.  Pulmonary:      Effort: Pulmonary effort is normal. No respiratory distress.   Abdominal:      General: There is no distension.      Palpations: Abdomen is soft. There is no mass.      Tenderness: There is no abdominal tenderness. There is no guarding or rebound.    Musculoskeletal:         General: Normal range of motion.      Cervical back: Normal range of motion and neck supple.   Skin:     General: Skin is warm and dry.   Neurological:      Mental Status: He is alert and oriented to person, place, and time.          Document Review:  Pathology - 7/29/2024  Final Diagnosis:      Ileum, appendix and cecum; ileocecectomy:   Ileum with ulceration and extensive acute inflammation, granulation tissue formation, and focal pyloric gland metaplasia (see comment).    Ileum with extensive submucosal hemorrhage (3.6 cm).  Serosal adhesions.    Colonic epithelium and cecum with mild submucosal congestion.  Proximal and distal margins are viable.  Vermiform appendix with fibrous obliteration of tip and lumen.  Fourteen lymph nodes, negative for carcinoma (0/14).  No definitive evidence of malignancy identified.     Comment:  Sections show area of ulceration with extensive acute inflammation and granulation tissue formation and focal pyloric gland metaplasia.  Presence of focal pyloric gland metaplasia raise the possibility of a chronic process such as inflammatory bowel disease.       Overall, these findings are suggestive of inflammatory bowel disease in appropriate clinical setting.       Clinical and endoscopic correlation is recommended.       For  purposes, selected slides of this case were reviewed by another pathologist who concurs with the above diagnosis.          Assessment / Plan:  S/p right hemicolectomy  Abnormal colonoscopy    Given diarrhea and weight loss, labs checked. CBC without leukocytosis, CMP without concern for dehydration or need for electrolyte repletions  Start daily cholestyramine for diarrhea, with PRN imodium   Encourage increased PO intake with focus on high calorie, high protein foods (e.g. ensure shakes).  RTC in 1 week for post op check and staple removal.    Seen and discussed with Dr. Justina Oliva PA-C  Department  of Surgical Oncology  [facility]  [address] [place]  [address]  [facility]  [address] [place] [address]  T: [phone]  F: [phone]      Patient seen and examined  Doing well overall from postoperative standpoint  Will evaluate labs to rule out dehydration given loose bowel movements  Follow-up next week  Will communicate with Dr. SON regarding setting up a follow-up appointment.    Shalom Horn MD  [facility] General Surgical Oncology  [facility]  [email or web address]

## 2024-08-16 ENCOUNTER — OFFICE VISIT (OUTPATIENT)
Dept: SURGERY | Facility: CLINIC | Age: 84
End: 2024-08-16
Payer: MEDICARE

## 2024-08-16 VITALS
BODY MASS INDEX: 19 KG/M2 | TEMPERATURE: 98 F | DIASTOLIC BLOOD PRESSURE: 77 MMHG | HEART RATE: 75 BPM | OXYGEN SATURATION: 100 % | SYSTOLIC BLOOD PRESSURE: 112 MMHG | RESPIRATION RATE: 16 BRPM | WEIGHT: 143.19 LBS

## 2024-08-16 DIAGNOSIS — Z90.49 S/P RIGHT HEMICOLECTOMY: Primary | ICD-10-CM

## 2024-08-16 PROCEDURE — 99024 POSTOP FOLLOW-UP VISIT: CPT | Performed by: PHYSICIAN ASSISTANT

## 2024-08-16 NOTE — PROGRESS NOTES
Returns for a post op check.  Patient is feeling better. He has gained 3lbs since last visit. Appetite improving, though not back to normal yet.  Diarrhea improved. Takes questran daily, but imodium only occasionally.   Strength improving.  Incisions cdi, healing well. Staples removed from periumbilical incision and steri strips placed.  He may return to regular diet without restrictions  He may also begin slowly returning to normal activities.   RTC in 4 weeks for additional post op check to ensure diarrhea and weight continue to trend up.    Caitlin Oliva PA-C  Department of Surgical Oncology  [address]  [id number] [place] [address]  [address]. [address] [place] [address]  T: [phone]  F: [phone]

## 2024-08-29 ENCOUNTER — OFFICE VISIT (OUTPATIENT)
Facility: CLINIC | Age: 84
End: 2024-08-29

## 2024-08-29 VITALS
WEIGHT: 144 LBS | BODY MASS INDEX: 19.5 KG/M2 | SYSTOLIC BLOOD PRESSURE: 114 MMHG | HEART RATE: 53 BPM | HEIGHT: 72 IN | DIASTOLIC BLOOD PRESSURE: 68 MMHG

## 2024-08-29 DIAGNOSIS — R63.4 WEIGHT LOSS: ICD-10-CM

## 2024-08-29 DIAGNOSIS — R19.8 IRREGULAR BOWEL HABITS: ICD-10-CM

## 2024-08-29 DIAGNOSIS — R93.89 ABNORMAL CT SCAN: ICD-10-CM

## 2024-08-29 DIAGNOSIS — Z90.49 S/P RIGHT HEMICOLECTOMY: Primary | ICD-10-CM

## 2024-08-29 PROCEDURE — 99214 OFFICE O/P EST MOD 30 MIN: CPT

## 2024-08-29 NOTE — PATIENT INSTRUCTIONS
-stop loperamide (imodium) because of constipation    -can continue cholestyramine but reduce to a half packet.   Reduce use or skip dose if constipation.   Increase to full packet if diarrhea    -can add back in fiber supplement, such as metamucil. Take daily     -follow up in 3 months       How can you add extra calories to your diet?  You can add extra calories to your meals and snacks with these tips:  Don't have foods that are light, reduced-calorie, nonfat or low fat. Buy whole-milk dairy products.  Add butter, margarine, or vegetable oil to breads, toast, crackers, or sandwiches. Also try these with potatoes, hot cereals, rice, noodles, soups, or casseroles.  Add sour cream to potatoes, rice, pasta, or vegetables. You can also use it as a dip for veggies or chips.  Add mayonnaise to sandwiches or crackers. Mix it in dips, salad dressings, or sauces. Eat it with meat, fish, eggs, or vegetable salads.  Add avocado to sandwiches.  Eat nuts or trail mix as a snack.  Eat canned fruit in heavy syrup rather than in water or light syrup.  Add cream cheese to fruit slices, raw vegetables, bread, toast, or crackers.  Use heavy creams in soups, sauces, batters, custards, puddings, shakes, mashed potatoes, or cooked cereals.  Use whipping cream on foods like pancakes, fruit, pudding, or other desserts. You can also mix it in cream soups, hot cereals, mashed potatoes, pudding, and custards.  Add brown sugar, maple syrup, or syrup to hot cereals, cold cereals, fruits, ice cream, or puddings. Use them as a glaze on meats or vegetables.  Add powdered milk to cereals, potatoes, cream soups, eggs, pudding, gravy, and casseroles. You can make super milk by adding 2 to 4 tablespoons of powdered milk to 1 cup of whole milk.  Add cheese to foods like sandwiches, burgers, toast, crackers, eggs, potatoes, and noodles.  Eat peanut butter with sandwiches, crackers, toast, fruit slices, vegetables, ice cream, or milkshakes.  Use  high-calorie drinks such as homemade milkshakes or commercial liquid nutrition supplements.

## 2024-08-29 NOTE — PROGRESS NOTES
[facility] - Gastroenterology                                                                                                                 Chief Complaint   Patient presents with    Follow - Up       HPI:   Kwabena Spann is a 83 year old year-old male with active diagnoses including of AAA s/p EVAR ( on Plavix), anemia, HTN, HLD, PVD, CKD. Prior medical/surgical history includes right hemicolectomy 2024 and SBO in 2021 in table below.    Today:  #s/p right hemicolectomy  #diarrhea/constipation  -today he feels better overall. He has gained 1 lb since prior visit w/ Dr. Horn. He was having diarrhea but became constipated after taking cholestyramine and imodium. Last imodium dose today. Stools range from bristol stool chart type 4 to 7. He denies abdominal pain or overt GI bleeding.   -diet recall: breakfast: oatmeal, banana // lunch: sandwich or soup // dinner: usually only has snack in evening like ice cream or jello // snack: crackers // drinks: ensure x1. Does not eat much veggies, some in soups.  -since prior visit he was treated with budesonide for microscopic colitis. Completed CT enterography in June with finding of abnormal small bowel loop, pt referred to Dr. Aparicio who recommend diagnostic laparoscopy with resection. On 7/29 the patient had surgery: lapascopic assisted right hemicolectomy. Biopsy was negative for malignancy but conerning for IBD \"Sections show area of ulceration with extensive acute inflammation and granulation tissue formation and focal pyloric gland metaplasia.  Presence of focal pyloric gland metaplasia raise the possibility of a chronic process such as inflammatory bowel disease.  Overall, these findings are suggestive of inflammatory bowel disease in appropriate clinical setting. \"  -saw Dr. Horn for post op visit on 8/6 and reported \"post operative visit. He has had 3-4  watery bowel movements daily. He is eating without nausea or vomiting, but low appetite feels everything \"goes right through\" with the diarrhea. 10lb weight loss since surgery. \". He was started on cholestyramine and prn imodium    Prior visit 5/30/24  #constipation/diarrhea  #microscopic colitis  -since prior visit symptoms are the same, still having periumbilical pain and alternating constipation & diarrhea. Weight is stable from 1 month ago.   -s/p EGD/CLN in April. Finding of microscopic colitis. No explanation of weight loss. CT enterography ordered by Dr. Chung, scheduled for 6/4  -has alternating constipation & diarrhea. He is taking miralax when needed.      Prior visit 4/17/24  he is here today for evaluation  #abdominal pain  #irregular bowel habits  #weight loss  -he reports daily periumbilical abdominal pain x 3 months that lasts 1-3 hours at a time. He will try warm milk, alk seltzer for the pain which sometimes help. Also feels full with small amounts of food. Reports lower appetite and about 10lb weight loss the past 2 months  -the past few months he has had constipation with bowel movement every 2-3 days with intermittent diarrhea. Occasionally will have a soft formed stool. Baseline bowel habits previously were bowel movements every other day with occasional constipation or diarrhea  -2/6/24 labs: mild normocytic anemia, hgb 12.2    Last saw GI at [facility] in 2021:  1. SBO: resolved, stable with symptoms. CT abd with mesenteric infiltration  Consider CT enterography, f/u in 3 months    2 . Lymphocytic colitis s/p Rx with 8-week course of Budesonide : patient is in remission  - Avoid NSAIDs     3. Normocytic anemia: unclear etiology , normal iron and ferritin     4. Post-prandial bloating: resolving with diet and lifestyle modification. Tums as needed  - Small frequent meals  - Low fat diet     5. Colon cancer screening/surveillance: patient is average risk for CRC, > 3 adenomas were resected  during his last colonoscopy in 09/20  - Surveillance colonoscopy in 09/2023     Patient denies symptoms of nausea, vomiting, dyspepsia, dysphagia, odynophagia, globus sensation, heartburn, hematemesis, hematochezia, or melena. he denies recent fever.    Last colonoscopy:   4/30/2024 Dr. Chung  Colon polyps, benign, precancerous  Uncomplicated sigmoid colon diverticulosis  Biopsy +microscopic colitis (untreated b/c pt asymptomatic)    2020 @ [facility] - 3 year recall  -4 tubular adenomas  -lymphocytic colitis  -diverticulosis  -internal & external hemorrhoids    Last EGD:   4/30/2024 Dr. Chung  -Antral and cardial gastritis as described above.  The antral gastritis is of uncertain behavior.  -Subepithelial cervical esophageal nodule benign in appearance    2020 @ [facility]  - Normal esophagus.  - Z-line regular, 45 cm from the incisors.  - Gastritis.  - Normal examined duodenum. Biopsied.  - Biopsies were taken in the stomach with a cold forceps for histology and  Helicobacter pylori testing.     FINAL DIAGNOSIS   A.  SMALL BOWEL; BIOPSY:   -DUODENAL MUCOSA WITHIN NORMAL LIMITS   -PRESERVED VILLOUS ARCHITECTURE AND NO INCREASE IN INTRAEPITHELIAL LYMPHOCYTES     B.  GASTRIC; BIOPSY:   -ANTRAL AND OXYNTIC TYPE MUCOSA WITH MILD CHRONIC INACTIVE GASTRITIS   -NEGATIVE FOR H. PYLORI     NSAIDS:none   Tobacco: former  Alcohol:  Marijuana: none   Illicit drugs: none     FH GI malignancy: none   FH IBD: none     No history of adverse reaction to sedation  No RIP  YES anticoagulants/antiplatelet - clopidogrel (Plavix)   No pacemaker/defibrillator    Wt Readings from Last 6 Encounters:   08/29/24 144 lb (65.3 kg)   08/16/24 143 lb 3.2 oz (65 kg)   08/06/24 140 lb 9.6 oz (63.8 kg)   07/29/24 150 lb (68 kg)   07/17/24 152 lb (68.9 kg)   06/18/24 150 lb (68 kg)        History, Medications, Allergies, ROS:      Past Medical History:    Deep vein thrombosis (HCC)    High blood pressure    High cholesterol    History of  repair of aneurysm of abdominal aorta using endovascular stent graft    Hyperlipidemia    Renal disorder    SBO (small bowel obstruction) (HCC)      Past Surgical History:   Procedure Laterality Date    Colonoscopy N/A 4/30/2024    Procedure: COLONOSCOPY;  Surgeon: Wali Chung MD;  Location: [facility] ENDOSCOPY    Cyst removal      Endovascular repair, infrarenal abdominal aortic aneury      endovascular stent graft    Hernia surgery        Family Hx: History reviewed. No pertinent family history.   Social History:   Social History     Socioeconomic History    Marital status:    Tobacco Use    Smoking status: Former     Types: Cigarettes     Start date: 4/20/1964    Smokeless tobacco: Never    Tobacco comments:     4-6    Vaping Use    Vaping status: Never Used   Substance and Sexual Activity    Alcohol use: Yes     Comment: 1 shot a day    Drug use: Never   Other Topics Concern    Weight Concern No     Social Determinants of Health     Financial Resource Strain: Low Risk  (8/1/2024)    Financial Resource Strain     Difficulty of Paying Living Expenses: Not hard at all     Med Affordability: No   Food Insecurity: No Food Insecurity (7/29/2024)    Food Insecurity     Food Insecurity: Never true   Transportation Needs: No Transportation Needs (7/29/2024)    Transportation Needs     Lack of Transportation: No   Housing Stability: Low Risk  (7/29/2024)    Housing Stability     Housing Instability: No        Medications (Active prior to today's visit):  Current Outpatient Medications   Medication Sig Dispense Refill    cholestyramine light 4 g Oral Powd Pack Take 1 packet (4 g total) by mouth daily. 30 packet 0    Loperamide HCl 2 MG Oral Tab Take 1 tablet (2 mg total) by mouth as needed for Diarrhea. 20 tablet 0    polyethylene glycol, PEG 3350, 17 g Oral Powd Pack Take 17 g by mouth daily.      oxyCODONE 5 MG Oral Tab Take 1 tablet (5 mg total) by mouth every 4 (four) hours as needed. 20 tablet 0     acetaminophen 500 MG Oral Tab Take 2 tablets (1,000 mg total) by mouth every 6 (six) hours as needed for Pain.      SIMVASTATIN 40 MG Oral Tab TAKE 1 TABLET BY MOUTH EVERY DAY AT NIGHT 90 tablet 0    LOSARTAN 50 MG Oral Tab TAKE 1 TABLET BY MOUTH EVERY DAY 90 tablet 0    metoprolol succinate ER 25 MG Oral Tablet 24 Hr Take 1 tablet (25 mg total) by mouth daily.         Allergies:  No Known Allergies    ROS:   CONSTITUTIONAL: negative for fevers, chills, sweats  EYES Negative for scleral icterus or redness, and diplopia  HEENT: Negative for hoarseness  RESPIRATORY: Negative for cough and severe shortness of breath  CARDIOVASCULAR: Negative for crushing sub-sternal chest pain  GASTROINTESTINAL: See HPI  GENITOURINARY: Negative for dysuria  MUSCULOSKELETAL: Negative for arthralgias and myalgias  SKIN: Negative for jaundice, rash or pruritus  NEUROLOGICAL: Negative for dizziness and headaches  BEHAVIOR/PSYCH: Negative for psychotic behavior    PHYSICAL EXAM:   Blood pressure 114/68, pulse 53, height 6' (1.829 m), weight 144 lb (65.3 kg).    GEN: Alert, no acute distress, well-nourished   HEENT: anicteric sclera, neck supple, trachea midline, MMM, no palpable or tender neck or supraclavicular lymph nodes  CV: RRR, the extremities are warm and well perfused   LUNGS: No increased work of breathing, CTAB  ABDOMEN: Soft, symmetrical, non-tender without distention or guarding. No lesions. Aorta is without bruit or visible pulsation. Umbilicus is midline without herniation. Normoactive bowel sounds are present, No masses, hepatomegaly or splenomegaly noted. +surgical scars  MSK: No erythema, no warmth, no swelling of joints  SKIN: No jaundice, no erythema, no lesions. Superficial red discoloration to left shin  HEMATOLOGIC: No bleeding, no bruising  NEURO: Alert and interactive, JOHNS  PSYCH: appropriate mood & affect    Labs/Imaging/Procedures:     Patient's pertinent labs and imaging were reviewed and discussed with patient  today.        .  ASSESSMENT/PLAN:   Kwabena Spann is a 83 year old year-old male with active diagnoses including of AAA s/p EVAR ( on Plavix), anemia, HTN, HLD, PVD, CKD. Prior medical/surgical history includes right hemicolectomy 2024 and SBO in 2021 in table below.    Today:  #s/p right hemicolectomy  #diarrhea/constipation  -today he feels better overall. He has gained 1 lb since prior visit w/ Dr. Horn. He was having diarrhea but became constipated after taking cholestyramine and imodium. Last imodium dose today. Stools range from bristol stool chart type 4 to 7. He denies abdominal pain or overt GI bleeding.   -diet recall: breakfast: oatmeal, banana // lunch: sandwich or soup // dinner: usually only has snack in evening like ice cream or jello // snack: crackers // drinks: ensure x1. Does not eat much veggies, some in soups.  -since prior visit he was treated with budesonide for microscopic colitis. Completed CT enterography in June with finding of abnormal small bowel loop, pt referred to Dr. Aparicio who recommend diagnostic laparoscopy with resection. On 7/29 the patient had surgery: lapascopic assisted right hemicolectomy. Biopsy was negative for malignancy but conerning for IBD \"Sections show area of ulceration with extensive acute inflammation and granulation tissue formation and focal pyloric gland metaplasia.  Presence of focal pyloric gland metaplasia raise the possibility of a chronic process such as inflammatory bowel disease.  Overall, these findings are suggestive of inflammatory bowel disease in appropriate clinical setting. \"  -saw Dr. Horn for post op visit on 8/6 and reported \"post operative visit. He has had 3-4 watery bowel movements daily. He is eating without nausea or vomiting, but low appetite feels everything \"goes right through\" with the diarrhea. 10lb weight loss since surgery. \". He was started on cholestyramine and prn imodium  -the patient endorses he has a poor  memory. He is somewhat poor historian regarding bowel habits     -at follow up will order Ct enterography, timing about 6 months post-op. And consider repeat colonoscopy pending results and symptoms.    Recommendations:  -stop loperamide (imodium) because of constipation    -can continue cholestyramine but reduce to a half packet.   Reduce use or skip dose if constipation.   Increase to full packet if diarrhea    -can add back in fiber supplement, such as metamucil. Take daily     -follow up in 3 months         Orders This Visit:  No orders of the defined types were placed in this encounter.      Meds This Visit:  Requested Prescriptions      No prescriptions requested or ordered in this encounter       Imaging & Referrals:  None      ABBI Gross    [facility]  8/29/2024        This note was partially prepared using Dragon Medical voice recognition dictation software. As a result, errors may occur. When identified, these errors have been corrected. While every attempt is made to correct errors during dictation, discrepancies may still exist.

## 2024-09-10 ENCOUNTER — OFFICE VISIT (OUTPATIENT)
Dept: SURGERY | Facility: CLINIC | Age: 84
End: 2024-09-10
Payer: MEDICARE

## 2024-09-10 VITALS
DIASTOLIC BLOOD PRESSURE: 81 MMHG | SYSTOLIC BLOOD PRESSURE: 126 MMHG | HEART RATE: 56 BPM | OXYGEN SATURATION: 100 % | RESPIRATION RATE: 16 BRPM | HEIGHT: 72.01 IN | WEIGHT: 140.5 LBS | BODY MASS INDEX: 19.03 KG/M2 | TEMPERATURE: 98 F

## 2024-09-10 DIAGNOSIS — Z90.49 S/P RIGHT HEMICOLECTOMY: Primary | ICD-10-CM

## 2024-09-10 PROCEDURE — 99024 POSTOP FOLLOW-UP VISIT: CPT | Performed by: SURGERY

## 2024-09-10 RX ORDER — CLOPIDOGREL BISULFATE 75 MG/1
75 TABLET ORAL DAILY
COMMUNITY
Start: 2024-09-04

## 2024-09-16 NOTE — PROGRESS NOTES
[facility]    Patient Name:  Kwabnea Spann   YOB: 1940   Gender:  Male   Appt Date:  9/10/2024   Provider:  Shalom Horn MD   Insurance:  MEDICARE PART B ONLY     PATIENT PROVIDERS  Referring Provider: No ref. provider found   Address: No referring provider defined for this encounter.   Phone #: N/A    Primary Care Provider:Josef Boyce MD   Address: [address] [place] [id number]   Phone #: [phone]       CHIEF COMPLAINT  Chief Complaint   Patient presents with    Post-Op        PROBLEMS  Reviewed   Patient Active Problem List   Diagnosis    Abdominal aortic aneurysm (AAA) without rupture (HCC)    Hyperlipidemia    Peripheral vascular disease (HCC)    Tobacco use disorder    Leg cramps    Dysequilibrium    Stage 3 chronic kidney disease (HCC)    Primary hypertension    Nonrheumatic mitral valve regurgitation    S/P right hemicolectomy        History of Present Illness:  Kwabena Spann is a 83 year old Male with PMHx AAA s/p repair, HTN, HLD, PAD, CKD is following up after right hemicolectomy.    Patient presented to GI with abdominal pain for several months and 10 lb weight loss over 2-3 months. He also endorses alternating constipation and diarrhea. CT A/P 3/12 showed mild bowel wall thickening in two section of the distal ileum with a few small soft tissue nodules, with differential including resolving enteritis or carcinoid tumor with adjacent desmoplastic reaction, but no other acute findings to explain abdominal pain. He underwent EGD/CLN showed 2 colon polyps but otherwise normal appearance. 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 On 7/29 he underwent diagnostic laparoscopy with intraoperative findings concerning for malignancy so decision made to resect. He had ileocecectomy with primary anastomosis. Pathology returned to show no evidence of  malignancy- ulceration and extensive inflammation, granulation formamtion and submucosal hemorrhage that could be consistent with a chronic inflammatory process, such as IBD.    Doing well; no ongoing symptoms.     Vital Signs:  /81 (BP Location: Left arm, Patient Position: Sitting, Cuff Size: adult)   Pulse 56   Temp 97.8 °F (36.6 °C) (Temporal)   Resp 16   Ht 1.829 m (6' 0.01\")   Wt 63.7 kg (140 lb 8 oz)   SpO2 100%   BMI 19.05 kg/m²      Medications Reviewed:    Current Outpatient Medications:     clopidogrel 75 MG Oral Tab, Take 1 tablet (75 mg total) by mouth daily., Disp: , Rfl:     Loperamide HCl 2 MG Oral Tab, Take 1 tablet (2 mg total) by mouth as needed for Diarrhea., Disp: 20 tablet, Rfl: 0    polyethylene glycol, PEG 3350, 17 g Oral Powd Pack, Take 17 g by mouth daily., Disp: , Rfl:     acetaminophen 500 MG Oral Tab, Take 2 tablets (1,000 mg total) by mouth every 6 (six) hours as needed for Pain., Disp: , Rfl:     SIMVASTATIN 40 MG Oral Tab, TAKE 1 TABLET BY MOUTH EVERY DAY AT NIGHT, Disp: 90 tablet, Rfl: 0    LOSARTAN 50 MG Oral Tab, TAKE 1 TABLET BY MOUTH EVERY DAY, Disp: 90 tablet, Rfl: 0    metoprolol succinate ER 25 MG Oral Tablet 24 Hr, Take 1 tablet (25 mg total) by mouth daily., Disp: , Rfl:      Allergies Reviewed:  No Known Allergies     History:  Reviewed:  Past Medical History:    Deep vein thrombosis (HCC)    High blood pressure    High cholesterol    History of repair of aneurysm of abdominal aorta using endovascular stent graft    Hyperlipidemia    Renal disorder    SBO (small bowel obstruction) (HCC)      Reviewed:  Past Surgical History:   Procedure Laterality Date    Colonoscopy N/A 4/30/2024    Procedure: COLONOSCOPY;  Surgeon: Wali Chung MD;  Location: [facility] ENDOSCOPY    Cyst removal      Endovascular repair, infrarenal abdominal aortic aneury      endovascular stent graft    Hernia surgery        Reviewed Social History:  Social History     Socioeconomic History     Marital status:    Tobacco Use    Smoking status: Former     Types: Cigarettes     Start date: 4/20/1964    Smokeless tobacco: Never    Tobacco comments:     4-6    Vaping Use    Vaping status: Never Used   Substance and Sexual Activity    Alcohol use: Yes     Comment: 1 shot a day    Drug use: Never   Other Topics Concern    Weight Concern No      Reviewed:  No family history on file.     Review of Systems:  Review of Systems   Constitutional:  Positive for appetite change and unexpected weight change. Negative for fatigue.   HENT: Negative.     Eyes: Negative.    Respiratory:  Negative for shortness of breath.    Cardiovascular:  Negative for chest pain.   Gastrointestinal:  Positive for abdominal pain and diarrhea. Negative for abdominal distention, constipation, nausea and vomiting.   Endocrine: Negative.    Genitourinary: Negative.    Musculoskeletal:  Negative for back pain.   Skin:  Negative for rash and wound.   Neurological:  Negative for weakness.   Hematological:  Negative for adenopathy.   Psychiatric/Behavioral: Negative.          Physical Examination:  Physical Exam  Constitutional:       General: He is not in acute distress.     Appearance: He is well-developed.   HENT:      Head: Normocephalic and atraumatic.   Eyes:      General: No scleral icterus.  Pulmonary:      Effort: Pulmonary effort is normal. No respiratory distress.   Abdominal:      General: There is no distension.      Palpations: Abdomen is soft. There is no mass.      Tenderness: There is no abdominal tenderness. There is no guarding or rebound.   Musculoskeletal:         General: Normal range of motion.      Cervical back: Normal range of motion and neck supple.   Skin:     General: Skin is warm and dry.   Neurological:      Mental Status: He is alert and oriented to person, place, and time.          Document Review:  Pathology - 7/29/2024  Final Diagnosis:      Ileum, appendix and cecum; ileocecectomy:   Ileum with  ulceration and extensive acute inflammation, granulation tissue formation, and focal pyloric gland metaplasia (see comment).    Ileum with extensive submucosal hemorrhage (3.6 cm).  Serosal adhesions.    Colonic epithelium and cecum with mild submucosal congestion.  Proximal and distal margins are viable.  Vermiform appendix with fibrous obliteration of tip and lumen.  Fourteen lymph nodes, negative for carcinoma (0/14).  No definitive evidence of malignancy identified.     Comment:  Sections show area of ulceration with extensive acute inflammation and granulation tissue formation and focal pyloric gland metaplasia.  Presence of focal pyloric gland metaplasia raise the possibility of a chronic process such as inflammatory bowel disease.       Overall, these findings are suggestive of inflammatory bowel disease in appropriate clinical setting.       Clinical and endoscopic correlation is recommended.       For  purposes, selected slides of this case were reviewed by another pathologist who concurs with the above diagnosis.          Assessment / Plan:  S/p right hemicolectomy    Doing well from postoperative standpoint  Patient has been contacted by Dr. SON regarding setting up a follow-up appointment. He has yet to do so.    Shalom Horn MD  [facility] Surgical Oncology  [facility]  [email or web address]

## 2024-11-26 ENCOUNTER — OFFICE VISIT (OUTPATIENT)
Facility: CLINIC | Age: 84
End: 2024-11-26

## 2024-11-26 VITALS — WEIGHT: 163 LBS | HEIGHT: 72 IN | BODY MASS INDEX: 22.08 KG/M2

## 2024-11-26 DIAGNOSIS — K52.9 CHRONIC DIARRHEA: ICD-10-CM

## 2024-11-26 DIAGNOSIS — Z90.49 S/P RIGHT HEMICOLECTOMY: ICD-10-CM

## 2024-11-26 DIAGNOSIS — R89.7 ABNORMAL BIOPSY RESULT: Primary | ICD-10-CM

## 2024-11-26 PROCEDURE — 99214 OFFICE O/P EST MOD 30 MIN: CPT

## 2024-11-26 RX ORDER — CHOLESTYRAMINE LIGHT 4 G/5.7G
4 POWDER, FOR SUSPENSION ORAL
Qty: 30 PACKET | Refills: 2 | Status: SHIPPED | OUTPATIENT
Start: 2024-11-26 | End: 2025-02-24

## 2024-11-26 RX ORDER — LOPERAMIDE HYDROCHLORIDE 2 MG/1
2 CAPSULE ORAL 4 TIMES DAILY PRN
Qty: 100 CAPSULE | Refills: 0 | Status: SHIPPED | OUTPATIENT
Start: 2024-11-26

## 2024-11-26 NOTE — PROGRESS NOTES
[facility] - Gastroenterology                                                                                                                 Chief Complaint   Patient presents with    Follow - Up     diarrhea       HPI:   Kwabena Spann is a 83 year old year-old male with active diagnoses including of AAA s/p EVAR ( on Plavix), anemia, HTN, HLD, PVD, CKD. Prior medical/surgical history includes right hemicolectomy 2024 and SBO in 2021 in table below.    Today:  #s/p hemicolectomy  #chronic diarrhea  #abnl biopsy  -since prior visit he is feeling well, reports appetite is good. Still having 2-4 episodes brown diarrhea a day, bristol type 6-7.   -still takes loperamide (imodium) 2mg as needed. Takes multiple days a week. Not taking cholestyramine  -denies abdominal pain  April 2024 // +microscopic colitis in April, diagnosed on colonoscopy. Not treated at that time because patient asymptomatic.   May 2024  // worsening diarrhea and treated with budesonide end of May.  June 2024 // Completed CT enterography in June with finding of abnormal small bowel loop, pt referred to Dr. Aparicio who recommend diagnostic laparoscopy with resection.   July 2024 // On 7/29 the patient had surgery: lapascopic assisted right hemicolectomy. Biopsy was negative for malignancy but concerning for IBD \"Sections show area of ulceration with extensive acute inflammation and granulation tissue formation and focal pyloric gland metaplasia.  Presence of focal pyloric gland metaplasia raise the possibility of a chronic process such as inflammatory bowel disease.  Overall, these findings are suggestive of inflammatory bowel disease in appropriate clinical setting. \"  -results discussed with Dr. Chung who advised cholestyramine and repeat CT enterography in 6 months.     Prior visit 8/29/24  #s/p right  hemicolectomy  #diarrhea/constipation  -today he feels better overall. He has gained 1 lb since prior visit w/ Dr. Horn. He was having diarrhea but became constipated after taking cholestyramine and imodium. Last imodium dose today. Stools range from bristol stool chart type 4 to 7. He denies abdominal pain or overt GI bleeding.   -diet recall: breakfast: oatmeal, banana // lunch: sandwich or soup // dinner: usually only has snack in evening like ice cream or jello // snack: crackers // drinks: ensure x1. Does not eat much veggies, some in soups.  -since prior visit he was treated with budesonide for microscopic colitis. Completed CT enterography in June with finding of abnormal small bowel loop, pt referred to Dr. Aparicio who recommend diagnostic laparoscopy with resection. On 7/29 the patient had surgery: lapascopic assisted right hemicolectomy. Biopsy was negative for malignancy but conerning for IBD \"Sections show area of ulceration with extensive acute inflammation and granulation tissue formation and focal pyloric gland metaplasia.  Presence of focal pyloric gland metaplasia raise the possibility of a chronic process such as inflammatory bowel disease.  Overall, these findings are suggestive of inflammatory bowel disease in appropriate clinical setting. \"  -saw Dr. Horn for post op visit on 8/6 and reported \"post operative visit. He has had 3-4 watery bowel movements daily. He is eating without nausea or vomiting, but low appetite feels everything \"goes right through\" with the diarrhea. 10lb weight loss since surgery. \". He was started on cholestyramine and prn imodium    Prior visit 5/30/24  #constipation/diarrhea  #microscopic colitis  -since prior visit symptoms are the same, still having periumbilical pain and alternating constipation & diarrhea. Weight is stable from 1 month ago.   -s/p EGD/CLN in April. Finding of microscopic colitis. No explanation of weight loss. CT enterography ordered by  Dr. Chung, scheduled for 6/4  -has alternating constipation & diarrhea. He is taking miralax when needed.      Prior visit 4/17/24  he is here today for evaluation  #abdominal pain  #irregular bowel habits  #weight loss  -he reports daily periumbilical abdominal pain x 3 months that lasts 1-3 hours at a time. He will try warm milk, alk seltzer for the pain which sometimes help. Also feels full with small amounts of food. Reports lower appetite and about 10lb weight loss the past 2 months  -the past few months he has had constipation with bowel movement every 2-3 days with intermittent diarrhea. Occasionally will have a soft formed stool. Baseline bowel habits previously were bowel movements every other day with occasional constipation or diarrhea  -2/6/24 labs: mild normocytic anemia, hgb 12.2    Last saw GI at [place] in 2021:  1. SBO: resolved, stable with symptoms. CT abd with mesenteric infiltration  Consider CT enterography, f/u in 3 months    2 . Lymphocytic colitis s/p Rx with 8-week course of Budesonide : patient is in remission  - Avoid NSAIDs     3. Normocytic anemia: unclear etiology , normal iron and ferritin     4. Post-prandial bloating: resolving with diet and lifestyle modification. Tums as needed  - Small frequent meals  - Low fat diet     5. Colon cancer screening/surveillance: patient is average risk for CRC, > 3 adenomas were resected during his last colonoscopy in 09/20  - Surveillance colonoscopy in 09/2023     Patient denies symptoms of nausea, vomiting, dyspepsia, dysphagia, odynophagia, globus sensation, heartburn, hematemesis, hematochezia, or melena. he denies recent fever.    Last colonoscopy:   4/30/2024 Dr. Chung  Colon polyps, benign, precancerous  Uncomplicated sigmoid colon diverticulosis  Biopsy +microscopic colitis (untreated b/c pt asymptomatic)    2020 @ [place] - 3 year recall  -4 tubular adenomas  -lymphocytic colitis  -diverticulosis  -internal & external  hemorrhoids    Last EGD:   4/30/2024 Dr. Chung  -Antral and cardial gastritis as described above.  The antral gastritis is of uncertain behavior.  -Subepithelial cervical esophageal nodule benign in appearance    2020 @ [facility]  - Normal esophagus.  - Z-line regular, 45 cm from the incisors.  - Gastritis.  - Normal examined duodenum. Biopsied.  - Biopsies were taken in the stomach with a cold forceps for histology and  Helicobacter pylori testing.     FINAL DIAGNOSIS   A.  SMALL BOWEL; BIOPSY:   -DUODENAL MUCOSA WITHIN NORMAL LIMITS   -PRESERVED VILLOUS ARCHITECTURE AND NO INCREASE IN INTRAEPITHELIAL LYMPHOCYTES     B.  GASTRIC; BIOPSY:   -ANTRAL AND OXYNTIC TYPE MUCOSA WITH MILD CHRONIC INACTIVE GASTRITIS   -NEGATIVE FOR H. PYLORI     NSAIDS:none   Tobacco: former  Alcohol:  Marijuana: none   Illicit drugs: none     FH GI malignancy: none   FH IBD: none     No history of adverse reaction to sedation  No RIP  YES anticoagulants/antiplatelet - clopidogrel (Plavix)   No pacemaker/defibrillator    Wt Readings from Last 6 Encounters:   11/26/24 163 lb (73.9 kg)   09/10/24 140 lb 8 oz (63.7 kg)   08/29/24 144 lb (65.3 kg)   08/16/24 143 lb 3.2 oz (65 kg)   08/06/24 140 lb 9.6 oz (63.8 kg)   07/29/24 150 lb (68 kg)        History, Medications, Allergies, ROS:      Past Medical History:    Deep vein thrombosis (HCC)    High blood pressure    High cholesterol    History of repair of aneurysm of abdominal aorta using endovascular stent graft    Hyperlipidemia    Renal disorder    SBO (small bowel obstruction) (HCC)      Past Surgical History:   Procedure Laterality Date    Colonoscopy N/A 4/30/2024    Procedure: COLONOSCOPY;  Surgeon: Wali Chung MD;  Location: [facility] ENDOSCOPY    Cyst removal      Endovascular repair, infrarenal abdominal aortic aneury      endovascular stent graft    Hernia surgery        Family Hx: No family history on file.   Social History:   Social History     Socioeconomic History     Marital status:    Tobacco Use    Smoking status: Former     Types: Cigarettes     Start date: 4/20/1964    Smokeless tobacco: Never    Tobacco comments:     4-6    Vaping Use    Vaping status: Never Used   Substance and Sexual Activity    Alcohol use: Yes     Comment: 1 shot a day    Drug use: Never   Other Topics Concern    Weight Concern No     Social Drivers of Health     Financial Resource Strain: Low Risk  (8/1/2024)    Financial Resource Strain     Difficulty of Paying Living Expenses: Not hard at all     Med Affordability: No   Food Insecurity: No Food Insecurity (7/29/2024)    Food Insecurity     Food Insecurity: Never true   Transportation Needs: No Transportation Needs (7/29/2024)    Transportation Needs     Lack of Transportation: No   Housing Stability: Low Risk  (7/29/2024)    Housing Stability     Housing Instability: No        Medications (Active prior to today's visit):  Current Outpatient Medications   Medication Sig Dispense Refill    loperamide 2 MG Oral Cap Take 1 capsule (2 mg total) by mouth 4 (four) times daily as needed for Diarrhea (diarrhea). 100 capsule 0    cholestyramine light 4 g Oral Powd Pack Take 1 packet (4 g total) by mouth daily with dinner. 30 packet 2    clopidogrel 75 MG Oral Tab Take 1 tablet (75 mg total) by mouth daily.      polyethylene glycol, PEG 3350, 17 g Oral Powd Pack Take 17 g by mouth daily.      acetaminophen 500 MG Oral Tab Take 2 tablets (1,000 mg total) by mouth every 6 (six) hours as needed for Pain.      SIMVASTATIN 40 MG Oral Tab TAKE 1 TABLET BY MOUTH EVERY DAY AT NIGHT 90 tablet 0    LOSARTAN 50 MG Oral Tab TAKE 1 TABLET BY MOUTH EVERY DAY 90 tablet 0    metoprolol succinate ER 25 MG Oral Tablet 24 Hr Take 1 tablet (25 mg total) by mouth daily.         Allergies:  No Known Allergies    ROS:   CONSTITUTIONAL: negative for fevers, chills, sweats  EYES Negative for scleral icterus or redness, and diplopia  HEENT: Negative for hoarseness  RESPIRATORY:  Negative for cough and severe shortness of breath  CARDIOVASCULAR: Negative for crushing sub-sternal chest pain  GASTROINTESTINAL: See HPI  GENITOURINARY: Negative for dysuria  MUSCULOSKELETAL: Negative for arthralgias and myalgias  SKIN: Negative for jaundice, rash or pruritus  NEUROLOGICAL: Negative for dizziness and headaches  BEHAVIOR/PSYCH: Negative for psychotic behavior    PHYSICAL EXAM:   Height 6' (1.829 m), weight 163 lb (73.9 kg).    GEN: Alert, no acute distress, well-nourished   HEENT: anicteric sclera, neck supple, trachea midline, MMM, no palpable or tender neck or supraclavicular lymph nodes  CV: RRR, the extremities are warm and well perfused   LUNGS: No increased work of breathing, CTAB  ABDOMEN: Soft, symmetrical, non-tender without distention or guarding. No lesions. Aorta is without bruit or visible pulsation. Umbilicus is midline without herniation. Normoactive bowel sounds are present, No masses, hepatomegaly or splenomegaly noted. +surgical scars  MSK: No erythema, no warmth, no swelling of joints  SKIN: No jaundice, no erythema, no lesions. Superficial red discoloration to left shin  HEMATOLOGIC: No bleeding, no bruising  NEURO: Alert and interactive, JOHNS  PSYCH: appropriate mood & affect    Labs/Imaging/Procedures:     Patient's pertinent labs and imaging were reviewed and discussed with patient today.        .  ASSESSMENT/PLAN:   Kwabena Spann is a 83 year old year-old male with active diagnoses including of AAA s/p EVAR ( on Plavix), anemia, HTN, HLD, PVD, CKD. Prior medical/surgical history includes right hemicolectomy 2024 and SBO in 2021 in table below.    Today:  #s/p hemicolectomy  #chronic diarrhea  #abnl biopsy  -since prior visit he is feeling well, reports appetite is good. Still having 2-4 episodes brown diarrhea a day, bristol type 6-7.   -still takes loperamide (imodium) 2mg as needed. Takes multiple days a week. Not taking cholestyramine  -denies abdominal pain  April  2024 // +microscopic colitis in April, diagnosed on colonoscopy. Not treated at that time because patient asymptomatic.   May 2024  // worsening diarrhea and treated with budesonide end of May.  June 2024 // Completed CT enterography in June with finding of abnormal small bowel loop, pt referred to Dr. Aparicio who recommend diagnostic laparoscopy with resection.   July 2024 // On 7/29 the patient had surgery: lapascopic assisted right hemicolectomy. Biopsy was negative for malignancy but concerning for IBD \"Sections show area of ulceration with extensive acute inflammation and granulation tissue formation and focal pyloric gland metaplasia.  Presence of focal pyloric gland metaplasia raise the possibility of a chronic process such as inflammatory bowel disease.  Overall, these findings are suggestive of inflammatory bowel disease in appropriate clinical setting. \"  -results discussed with Dr. Chung who advised cholestyramine and repeat CT enterography in 6 months.   -the patient endorses he has a poor memory. He is somewhat poor historian regarding bowel habits     -plan pending CT results and response to cholestyramine     Recommendations:    -call to schedule CT enterography #[phone]    -start taking cholestyramine daily for diarrhea. Take during lunch    -can continue loperamide (imodium), but stop use if you are constipated     -follow up in 3 months      Orders This Visit:  No orders of the defined types were placed in this encounter.      Meds This Visit:  Requested Prescriptions     Signed Prescriptions Disp Refills    loperamide 2 MG Oral Cap 100 capsule 0     Sig: Take 1 capsule (2 mg total) by mouth 4 (four) times daily as needed for Diarrhea (diarrhea).    cholestyramine light 4 g Oral Powd Pack 30 packet 2     Sig: Take 1 packet (4 g total) by mouth daily with dinner.       Imaging & Referrals:  CT ENTEROGRAPHY ABD/PEL W CONTRAST (MLA=[id number])      ABBI Gross    [facility]  Gastroenterology  11/26/2024        This note was partially prepared using Dragon Medical voice recognition dictation software. As a result, errors may occur. When identified, these errors have been corrected. While every attempt is made to correct errors during dictation, discrepancies may still exist.

## 2024-11-26 NOTE — PATIENT INSTRUCTIONS
-call to schedule CT enterography #[phone]    -start taking cholestyramine daily for diarrhea. Take during lunch    -can continue loperamide (imodium), but stop use if you are constipated     -follow up in 3 months

## 2025-01-02 ENCOUNTER — HOSPITAL ENCOUNTER (OUTPATIENT)
Dept: CT IMAGING | Facility: HOSPITAL | Age: 85
Discharge: HOME | End: 2025-01-02
Payer: MEDICARE

## 2025-01-02 DIAGNOSIS — K52.9 CHRONIC DIARRHEA: ICD-10-CM

## 2025-01-02 DIAGNOSIS — R89.7 ABNORMAL BIOPSY RESULT: ICD-10-CM

## 2025-01-02 LAB
CREAT BLD-MCNC: 1.4 MG/DL
EGFRCR SERPLBLD CKD-EPI 2021: 50 ML/MIN/1.73M2 (ref 60–?)

## 2025-01-02 PROCEDURE — 82565 ASSAY OF CREATININE: CPT

## 2025-01-02 PROCEDURE — 74177 CT ABD & PELVIS W/CONTRAST: CPT

## 2025-01-08 ENCOUNTER — TELEPHONE (OUTPATIENT)
Facility: CLINIC | Age: 85
End: 2025-01-08

## 2025-01-10 ENCOUNTER — TELEPHONE (OUTPATIENT)
Dept: INTERNAL MEDICINE CLINIC | Facility: CLINIC | Age: 85
End: 2025-01-10

## 2025-01-10 DIAGNOSIS — C64.2 RENAL CELL CARCINOMA OF LEFT KIDNEY (HCC): Primary | ICD-10-CM

## 2025-01-10 NOTE — TELEPHONE ENCOUNTER
Pt called requesting a referral for a kidney specialist   If possible to inform him when referral is ready

## 2025-01-13 NOTE — TELEPHONE ENCOUNTER
Stacey Boyce did you want to see him first? He is asking for referral to kidney doctor.  Let me know who you recommend or you can put it in the pended referral

## 2025-01-13 NOTE — TELEPHONE ENCOUNTER
Referral entered Jey [phone]. Attempted to contact patient unable the reach Voicemail with the above information.

## 2025-02-26 ENCOUNTER — OFFICE VISIT (OUTPATIENT)
Facility: CLINIC | Age: 85
End: 2025-02-26

## 2025-02-26 VITALS
DIASTOLIC BLOOD PRESSURE: 90 MMHG | BODY MASS INDEX: 23.98 KG/M2 | SYSTOLIC BLOOD PRESSURE: 152 MMHG | HEART RATE: 93 BPM | WEIGHT: 177 LBS | HEIGHT: 72 IN

## 2025-02-26 DIAGNOSIS — K52.9 CHRONIC DIARRHEA: Primary | ICD-10-CM

## 2025-02-26 PROCEDURE — 99214 OFFICE O/P EST MOD 30 MIN: CPT

## 2025-02-26 NOTE — PROGRESS NOTES
[facility] - Gastroenterology                                                                                                                 Chief Complaint   Patient presents with    Follow - Up     3 month follow up/ Last CLN 2024       HPI:   Kwabena Spann is a 84 year old year-old male with active diagnoses including of AAA s/p EVAR ( on Plavix), anemia, HTN, HLD, PVD, CKD. Prior medical/surgical history includes right hemicolectomy 2024 and SBO in 2021 in table below.    Today:  #chronic diarrhea   -since prior visit he is feeling better. Having usually 1 bowel movement  daily of brown, formed stool. He has diarrhea about 1x per week and will have about 2 episodes that day. He is taking cholestyramine 1x week. He takes imodium a couple times a month. Uses metamucil 1x a month  -denies abdominal or rectal pain. CT enterography in January negative for enteritis \"Postoperative changes from an interval ileocecectomy.  No evidence of acute or chronic enteritis. \"  -renal lesion seen on CT in January, pt has nephrology appt on 3/11/25.   -he has been gaining weight      Prior visit 11/26/24  #s/p hemicolectomy  #chronic diarrhea  #abnl biopsy  -since prior visit he is feeling well, reports appetite is good. Still having 2-4 episodes brown diarrhea a day, bristol type 6-7.   -still takes loperamide (imodium) 2mg as needed. Takes multiple days a week. Not taking cholestyramine  -denies abdominal pain  April 2024 // +microscopic colitis in April, diagnosed on colonoscopy. Not treated at that time because patient asymptomatic.   May 2024  // worsening diarrhea and treated with budesonide end of May.  June 2024 // Completed CT enterography in June with finding of abnormal small bowel loop, pt referred to Dr. Aparicio who recommend diagnostic laparoscopy with resection.   July 2024 // On 7/29 the patient had surgery:  lapascopic assisted right hemicolectomy. Biopsy was negative for malignancy but concerning for IBD \"Sections show area of ulceration with extensive acute inflammation and granulation tissue formation and focal pyloric gland metaplasia.  Presence of focal pyloric gland metaplasia raise the possibility of a chronic process such as inflammatory bowel disease.  Overall, these findings are suggestive of inflammatory bowel disease in appropriate clinical setting. \"  -results discussed with Dr. Chung who advised cholestyramine and repeat CT enterography in 6 months.     Prior visit 8/29/24  #s/p right hemicolectomy  #diarrhea/constipation  -today he feels better overall. He has gained 1 lb since prior visit w/ Dr. Horn. He was having diarrhea but became constipated after taking cholestyramine and imodium. Last imodium dose today. Stools range from bristol stool chart type 4 to 7. He denies abdominal pain or overt GI bleeding.   -diet recall: breakfast: oatmeal, banana // lunch: sandwich or soup // dinner: usually only has snack in evening like ice cream or jello // snack: crackers // drinks: ensure x1. Does not eat much veggies, some in soups.  -since prior visit he was treated with budesonide for microscopic colitis. Completed CT enterography in June with finding of abnormal small bowel loop, pt referred to Dr. Aparicio who recommend diagnostic laparoscopy with resection. On 7/29 the patient had surgery: lapascopic assisted right hemicolectomy. Biopsy was negative for malignancy but conerning for IBD \"Sections show area of ulceration with extensive acute inflammation and granulation tissue formation and focal pyloric gland metaplasia.  Presence of focal pyloric gland metaplasia raise the possibility of a chronic process such as inflammatory bowel disease.  Overall, these findings are suggestive of inflammatory bowel disease in appropriate clinical setting. \"  -saw Dr. Horn for post op visit on 8/6 and  reported \"post operative visit. He has had 3-4 watery bowel movements daily. He is eating without nausea or vomiting, but low appetite feels everything \"goes right through\" with the diarrhea. 10lb weight loss since surgery. \". He was started on cholestyramine and prn imodium    Prior visit 5/30/24  #constipation/diarrhea  #microscopic colitis  -since prior visit symptoms are the same, still having periumbilical pain and alternating constipation & diarrhea. Weight is stable from 1 month ago.   -s/p EGD/CLN in April. Finding of microscopic colitis. No explanation of weight loss. CT enterography ordered by Dr. Chung, scheduled for 6/4  -has alternating constipation & diarrhea. He is taking miralax when needed.      Prior visit 4/17/24  he is here today for evaluation  #abdominal pain  #irregular bowel habits  #weight loss  -he reports daily periumbilical abdominal pain x 3 months that lasts 1-3 hours at a time. He will try warm milk, alk seltzer for the pain which sometimes help. Also feels full with small amounts of food. Reports lower appetite and about 10lb weight loss the past 2 months  -the past few months he has had constipation with bowel movement every 2-3 days with intermittent diarrhea. Occasionally will have a soft formed stool. Baseline bowel habits previously were bowel movements every other day with occasional constipation or diarrhea  -2/6/24 labs: mild normocytic anemia, hgb 12.2    Last saw GI at [facility] in 2021:  1. SBO: resolved, stable with symptoms. CT abd with mesenteric infiltration  Consider CT enterography, f/u in 3 months    2 . Lymphocytic colitis s/p Rx with 8-week course of Budesonide : patient is in remission  - Avoid NSAIDs     3. Normocytic anemia: unclear etiology , normal iron and ferritin     4. Post-prandial bloating: resolving with diet and lifestyle modification. Tums as needed  - Small frequent meals  - Low fat diet     5. Colon cancer screening/surveillance: patient is  average risk for CRC, > 3 adenomas were resected during his last colonoscopy in 09/20  - Surveillance colonoscopy in 09/2023     Patient denies symptoms of nausea, vomiting, dyspepsia, dysphagia, odynophagia, globus sensation, heartburn, hematemesis, hematochezia, or melena. he denies recent fever.    Last colonoscopy:   4/30/2024 Dr. Chung  Colon polyps, benign, precancerous  Uncomplicated sigmoid colon diverticulosis  Biopsy +microscopic colitis (untreated b/c pt asymptomatic)    2020 @ [facility] - 3 year recall  -4 tubular adenomas  -lymphocytic colitis  -diverticulosis  -internal & external hemorrhoids    Last EGD:   4/30/2024 Dr. Chung  -Antral and cardial gastritis as described above.  The antral gastritis is of uncertain behavior.  -Subepithelial cervical esophageal nodule benign in appearance    2020 @ [facility]  - Normal esophagus.  - Z-line regular, 45 cm from the incisors.  - Gastritis.  - Normal examined duodenum. Biopsied.  - Biopsies were taken in the stomach with a cold forceps for histology and  Helicobacter pylori testing.     FINAL DIAGNOSIS   A.  SMALL BOWEL; BIOPSY:   -DUODENAL MUCOSA WITHIN NORMAL LIMITS   -PRESERVED VILLOUS ARCHITECTURE AND NO INCREASE IN INTRAEPITHELIAL LYMPHOCYTES     B.  GASTRIC; BIOPSY:   -ANTRAL AND OXYNTIC TYPE MUCOSA WITH MILD CHRONIC INACTIVE GASTRITIS   -NEGATIVE FOR H. PYLORI     NSAIDS:none   Tobacco: former  Alcohol:  Marijuana: none   Illicit drugs: none     FH GI malignancy: none   FH IBD: none     No history of adverse reaction to sedation  No RIP  YES anticoagulants/antiplatelet - clopidogrel (Plavix)   No pacemaker/defibrillator    Wt Readings from Last 6 Encounters:   02/26/25 177 lb (80.3 kg)   11/26/24 163 lb (73.9 kg)   09/10/24 140 lb 8 oz (63.7 kg)   08/29/24 144 lb (65.3 kg)   08/16/24 143 lb 3.2 oz (65 kg)   08/06/24 140 lb 9.6 oz (63.8 kg)        History, Medications, Allergies, ROS:      Past Medical History:    Deep vein thrombosis (HCC)     High blood pressure    High cholesterol    History of repair of aneurysm of abdominal aorta using endovascular stent graft    Hyperlipidemia    Renal disorder    SBO (small bowel obstruction) (HCC)      Past Surgical History:   Procedure Laterality Date    Colonoscopy N/A 4/30/2024    Procedure: COLONOSCOPY;  Surgeon: Wali Chung MD;  Location: [facility] ENDOSCOPY    Cyst removal      Endovascular repair, infrarenal abdominal aortic aneury      endovascular stent graft    Hernia surgery        Family Hx: History reviewed. No pertinent family history.   Social History:   Social History     Socioeconomic History    Marital status:    Tobacco Use    Smoking status: Former     Types: Cigarettes     Start date: 4/20/1964    Smokeless tobacco: Never    Tobacco comments:     4-6    Vaping Use    Vaping status: Never Used   Substance and Sexual Activity    Alcohol use: Yes     Comment: 1 shot a day    Drug use: Never   Other Topics Concern    Weight Concern No     Social Drivers of Health     Food Insecurity: No Food Insecurity (7/29/2024)    Food Insecurity     Food Insecurity: Never true   Transportation Needs: No Transportation Needs (7/29/2024)    Transportation Needs     Lack of Transportation: No   Housing Stability: Low Risk  (7/29/2024)    Housing Stability     Housing Instability: No        Medications (Active prior to today's visit):  Current Outpatient Medications   Medication Sig Dispense Refill    loperamide 2 MG Oral Cap Take 1 capsule (2 mg total) by mouth 4 (four) times daily as needed for Diarrhea (diarrhea). 100 capsule 0    clopidogrel 75 MG Oral Tab Take 1 tablet (75 mg total) by mouth daily.      acetaminophen 500 MG Oral Tab Take 2 tablets (1,000 mg total) by mouth every 6 (six) hours as needed for Pain.      SIMVASTATIN 40 MG Oral Tab TAKE 1 TABLET BY MOUTH EVERY DAY AT NIGHT 90 tablet 0    LOSARTAN 50 MG Oral Tab TAKE 1 TABLET BY MOUTH EVERY DAY 90 tablet 0    metoprolol succinate ER 25 MG  Oral Tablet 24 Hr Take 1 tablet (25 mg total) by mouth daily.      polyethylene glycol, PEG 3350, 17 g Oral Powd Pack Take 17 g by mouth daily. (Patient not taking: Reported on 2/26/2025)         Allergies:  No Known Allergies    ROS:   CONSTITUTIONAL: negative for fevers, chills, sweats  EYES Negative for scleral icterus or redness, and diplopia  HEENT: Negative for hoarseness  RESPIRATORY: Negative for cough and severe shortness of breath  CARDIOVASCULAR: Negative for crushing sub-sternal chest pain  GASTROINTESTINAL: See HPI  GENITOURINARY: Negative for dysuria  MUSCULOSKELETAL: Negative for arthralgias and myalgias  SKIN: Negative for jaundice, rash or pruritus  NEUROLOGICAL: Negative for dizziness and headaches  BEHAVIOR/PSYCH: Negative for psychotic behavior    PHYSICAL EXAM:   Blood pressure 152/90, pulse 93, height 6' (1.829 m), weight 177 lb (80.3 kg).    GEN: Alert, no acute distress, well-nourished   HEENT: anicteric sclera, neck supple, trachea midline, MMM, no palpable or tender neck or supraclavicular lymph nodes  CV: RRR, the extremities are warm and well perfused   LUNGS: No increased work of breathing, CTAB  ABDOMEN: Soft, symmetrical, non-tender without distention or guarding. No lesions. Aorta is without bruit or visible pulsation. Umbilicus is midline without herniation. Normoactive bowel sounds are present, No masses, hepatomegaly or splenomegaly noted. +surgical scars  MSK: No erythema, no warmth, no swelling of joints  SKIN: No jaundice, no erythema, no lesions. Superficial red discoloration to left shin  HEMATOLOGIC: No bleeding, no bruising  NEURO: Alert and interactive, JOHNS  PSYCH: appropriate mood & affect    Labs/Imaging/Procedures:     Patient's pertinent labs and imaging were reviewed and discussed with patient today.        .  ASSESSMENT/PLAN:   Kwabena Spann is a 84 year old year-old male with active diagnoses including of AAA s/p EVAR ( on Plavix), anemia, HTN, HLD, PVD, CKD.  Prior medical/surgical history includes right hemicolectomy 2024 and SBO in 2021 in table below.    Today:  #chronic diarrhea   -since prior visit he is feeling better. Having usually 1 bowel movement  daily of brown, formed stool. He has diarrhea about 1x per week and will have about 2 episodes that day. He is taking cholestyramine 1x week. He takes imodium a couple times a month. Uses metamucil 1x a month  -denies abdominal or rectal pain. CT enterography in January negative for enteritis \"Postoperative changes from an interval ileocecectomy.  No evidence of acute or chronic enteritis. \"  -renal lesion seen on CT in January, pt has nephrology appt on 3/11/25.   -he has been gaining weight  April 2024 // +microscopic colitis in April, diagnosed on colonoscopy. Not treated at that time because patient asymptomatic.   May 2024  // worsening diarrhea and treated with budesonide end of May.  June 2024 // Completed CT enterography in June with finding of abnormal small bowel loop, pt referred to Dr. Aparicio who recommend diagnostic laparoscopy with resection.   July 2024 // On 7/29 the patient had surgery: lapascopic assisted right hemicolectomy. Biopsy was negative for malignancy but concerning for IBD \"Sections show area of ulceration with extensive acute inflammation and granulation tissue formation and focal pyloric gland metaplasia.  Presence of focal pyloric gland metaplasia raise the possibility of a chronic process such as inflammatory bowel disease.  Overall, these findings are suggestive of inflammatory bowel disease in appropriate clinical setting. \"    Patient is doing well with minimal medications. Advised patient to monitor and follow up in 6 months or sooner if needed      Recommendations:  -okay to continue metamucil fiber supplement daily or every other day     -okay to discontinue cholestyramine. Okay to continue 1x weekly if needed.    -can take imodium (loperamide) if more than 3 episodes of  diarrhea in a day    -follow up in 6 months      Orders This Visit:  No orders of the defined types were placed in this encounter.      Meds This Visit:  Requested Prescriptions      No prescriptions requested or ordered in this encounter       Imaging & Referrals:  None      ABBI Gross    [facility]astro[facility]  2/26/2025        This note was partially prepared using Dragon Medical voice recognition dictation software. As a result, errors may occur. When identified, these errors have been corrected. While every attempt is made to correct errors during dictation, discrepancies may still exist.

## 2025-02-26 NOTE — PATIENT INSTRUCTIONS
-okay to continue metamucil fiber supplement daily or every other day     -okay to discontinue cholestyramine. Okay to continue 1x weekly if needed.    -can take imodium (loperamide) if more than 3 episodes of diarrhea in a day    -follow up in 6 months

## 2025-03-11 ENCOUNTER — OFFICE VISIT (OUTPATIENT)
Dept: NEPHROLOGY | Facility: CLINIC | Age: 85
End: 2025-03-11

## 2025-03-11 VITALS
DIASTOLIC BLOOD PRESSURE: 68 MMHG | SYSTOLIC BLOOD PRESSURE: 116 MMHG | BODY MASS INDEX: 23.98 KG/M2 | HEART RATE: 65 BPM | HEIGHT: 72 IN | WEIGHT: 177 LBS

## 2025-03-11 DIAGNOSIS — N18.31 STAGE 3A CHRONIC KIDNEY DISEASE (HCC): Primary | ICD-10-CM

## 2025-03-11 DIAGNOSIS — N28.89 RENAL MASS, LEFT: ICD-10-CM

## 2025-03-11 PROCEDURE — 99205 OFFICE O/P NEW HI 60 MIN: CPT | Performed by: INTERNAL MEDICINE

## 2025-03-11 NOTE — PROGRESS NOTES
03/11/25        Patient: Kwabena Spann   YOB: 1940   Date of Visit: 3/11/2025       Dear  Dr. Austen MD,      Thank you for referring Kwabena Spann to my practice.  Please find my assessment and plan below.      As you know he is an 84-year-old male with a history of hypertension, status post endovascular repair of an abdominal aortic aneurysm, history of right hemicolectomy, peripheral vascular disease, intermittent diarrhea, who I now had the pleasure of seeing for evaluation of chronic kidney disease stage III and a possible left renal mass.    The patient had a CT scan of the abdomen on March 12, 2024 with IV contrast.  This revealed numerous bilateral renal cysts.  No enhancing renal mass was identified.  However he then had a CT enterography done in June or 2024.  This mentioned a 1.4 cm exophytic mass in the left kidney.  Finally a repeat CT enterography was done on January 2, 2025.  This again mention a 1.2 cm solid-appearing lesion in the left kidney which was grossly unchanged.    The patient really had a normal creatinine back in July 2020 for a 1.14.  However crept up to 1.27 in August 2024 and on January 2, 2025 his creatinine was 1.40 with an estimated GFR of 50 cc/min.    His past medical history is notable for endovascular repair of abdominal aortic aneurysm.  He also has had peripheral vascular disease.  He is not the best historian states he has only had hypertension for several years.  He had a right hemicolectomy.  There is a history of hypercholesterolemia but is not aware of any organic heart disease.  He mentions a some kind of drainage procedure on his right kidney 5 years ago.  He has chronic but very intermittent diarrhea followed by GI.    Medications are as listed.  Denies any significant use of nonsteroidals.  Social history former cigarette smoker.  Family history negative for renal pathology.  Review of system the patient otherwise states he is doing well  without any chest pain, shortness of breath, GI or urinary tract symptoms.  10 point review of systems is otherwise unremarkable.    Physical exam his blood pressure 116/68 with a pulse of 65 and weight 177 pounds.  His neck was supple without JVD.  Lungs were clear.  Heart revealed a regular rate and rhythm and S4 but no gallops, murmurs or rubs.  Abdomen was soft, flat, nontender without organomegaly, masses or bruits.  Extremities revealed no clubbing, cyanosis or edema.    I therefore informed the patient there is a questionable  left renal mass.  Also may be developing chronic kidney disease stage III.  This may be secondary to hypertension.  Other causes need to be excluded.    For completion sake a repeat CBC, renal panel, urinalysis, urine for microalbumin, urine for Bence-Scott protein, sed rate, connective tissue profile, and a renal ultrasound have been ordered.  Further impressions and recommendations will be forthcoming after reviewing the above.  If a renal mass is clearly identified would need to see urology for further workup and evaluation.  Otherwise maintain adequate hydration.  Avoid nonsteroidals.    Thank you very much for allowing me to participate in the care of your patient.  If you have any questions please feel free to call.           Sincerely,   Jey Alberts MD   [facility], [facility], [place]  [address]  [place] [address]    Document electronically generated by:  Jey Alberts MD

## 2025-03-26 ENCOUNTER — HOSPITAL ENCOUNTER (OUTPATIENT)
Dept: ULTRASOUND IMAGING | Facility: HOSPITAL | Age: 85
Discharge: HOME | End: 2025-03-26
Attending: INTERNAL MEDICINE
Payer: MEDICARE

## 2025-03-26 ENCOUNTER — LAB ENCOUNTER (OUTPATIENT)
Dept: LAB | Facility: HOSPITAL | Age: 85
End: 2025-03-26
Attending: INTERNAL MEDICINE
Payer: MEDICARE

## 2025-03-26 DIAGNOSIS — N28.89 RENAL MASS, LEFT: ICD-10-CM

## 2025-03-26 DIAGNOSIS — N18.31 STAGE 3A CHRONIC KIDNEY DISEASE (HCC): ICD-10-CM

## 2025-03-26 LAB
ALBUMIN SERPL-MCNC: 4.5 G/DL (ref 3.2–4.8)
ANION GAP SERPL CALC-SCNC: 4 MMOL/L (ref 0–18)
BASOPHILS # BLD AUTO: 0.04 X10(3) UL (ref 0–0.2)
BASOPHILS NFR BLD AUTO: 0.6 %
BUN BLD-MCNC: 22 MG/DL (ref 9–23)
BUN/CREAT SERPL: 15.5 (ref 10–20)
C3 SERPL-MCNC: 122.1 MG/DL (ref 90–170)
C4 SERPL-MCNC: 28.2 MG/DL (ref 12–36)
CALCIUM BLD-MCNC: 9.1 MG/DL (ref 8.7–10.4)
CHLORIDE SERPL-SCNC: 112 MMOL/L (ref 98–112)
CO2 SERPL-SCNC: 26 MMOL/L (ref 21–32)
CREAT BLD-MCNC: 1.42 MG/DL
CRP SERPL-MCNC: <0.4 MG/DL (ref ?–1)
DEPRECATED RDW RBC AUTO: 47.4 FL (ref 35.1–46.3)
EGFRCR SERPLBLD CKD-EPI 2021: 49 ML/MIN/1.73M2 (ref 60–?)
EOSINOPHIL # BLD AUTO: 0.21 X10(3) UL (ref 0–0.7)
EOSINOPHIL NFR BLD AUTO: 3.2 %
ERYTHROCYTE [DISTWIDTH] IN BLOOD BY AUTOMATED COUNT: 13.3 % (ref 11–15)
ERYTHROCYTE [SEDIMENTATION RATE] IN BLOOD: 2 MM/HR
GLUCOSE BLD-MCNC: 94 MG/DL (ref 70–99)
HCT VFR BLD AUTO: 37 %
HGB BLD-MCNC: 11.9 G/DL
IMM GRANULOCYTES # BLD AUTO: 0.03 X10(3) UL (ref 0–1)
IMM GRANULOCYTES NFR BLD: 0.5 %
LYMPHOCYTES # BLD AUTO: 1.48 X10(3) UL (ref 1–4)
LYMPHOCYTES NFR BLD AUTO: 22.3 %
MCH RBC QN AUTO: 31.2 PG (ref 26–34)
MCHC RBC AUTO-ENTMCNC: 32.2 G/DL (ref 31–37)
MCV RBC AUTO: 96.9 FL
MONOCYTES # BLD AUTO: 0.6 X10(3) UL (ref 0.1–1)
MONOCYTES NFR BLD AUTO: 9 %
NEUTROPHILS # BLD AUTO: 4.28 X10 (3) UL (ref 1.5–7.7)
NEUTROPHILS # BLD AUTO: 4.28 X10(3) UL (ref 1.5–7.7)
NEUTROPHILS NFR BLD AUTO: 64.4 %
OSMOLALITY SERPL CALC.SUM OF ELEC: 297 MOSM/KG (ref 275–295)
PHOSPHATE SERPL-MCNC: 3.9 MG/DL (ref 2.4–5.1)
PLATELET # BLD AUTO: 145 10(3)UL (ref 150–450)
PLATELETS.RETICULATED NFR BLD AUTO: 3.5 % (ref 0–7)
POTASSIUM SERPL-SCNC: 4.2 MMOL/L (ref 3.5–5.1)
RBC # BLD AUTO: 3.82 X10(6)UL
RHEUMATOID FACT SERPL-ACNC: <3.5 IU/ML (ref ?–14)
SODIUM SERPL-SCNC: 142 MMOL/L (ref 136–145)
WBC # BLD AUTO: 6.6 X10(3) UL (ref 4–11)

## 2025-03-26 PROCEDURE — 80069 RENAL FUNCTION PANEL: CPT

## 2025-03-26 PROCEDURE — 36415 COLL VENOUS BLD VENIPUNCTURE: CPT

## 2025-03-26 PROCEDURE — 86140 C-REACTIVE PROTEIN: CPT

## 2025-03-26 PROCEDURE — 85652 RBC SED RATE AUTOMATED: CPT

## 2025-03-26 PROCEDURE — 84165 PROTEIN E-PHORESIS SERUM: CPT

## 2025-03-26 PROCEDURE — 85025 COMPLETE CBC W/AUTO DIFF WBC: CPT

## 2025-03-26 PROCEDURE — 86160 COMPLEMENT ANTIGEN: CPT

## 2025-03-26 PROCEDURE — 86235 NUCLEAR ANTIGEN ANTIBODY: CPT

## 2025-03-26 PROCEDURE — 86431 RHEUMATOID FACTOR QUANT: CPT

## 2025-03-26 PROCEDURE — 86039 ANTINUCLEAR ANTIBODIES (ANA): CPT

## 2025-03-26 PROCEDURE — 86225 DNA ANTIBODY NATIVE: CPT

## 2025-03-26 PROCEDURE — 86038 ANTINUCLEAR ANTIBODIES: CPT

## 2025-03-26 PROCEDURE — 76770 US EXAM ABDO BACK WALL COMP: CPT | Performed by: INTERNAL MEDICINE

## 2025-03-27 ENCOUNTER — TELEPHONE (OUTPATIENT)
Dept: NEPHROLOGY | Facility: CLINIC | Age: 85
End: 2025-03-27

## 2025-03-27 LAB
ALBUMIN SERPL ELPH-MCNC: 3.81 G/DL (ref 3.75–5.21)
ALBUMIN/GLOB SERPL: 1.53 {RATIO} (ref 1–2)
ALPHA1 GLOB SERPL ELPH-MCNC: 0.25 G/DL (ref 0.19–0.46)
ALPHA2 GLOB SERPL ELPH-MCNC: 0.74 G/DL (ref 0.48–1.05)
B-GLOBULIN SERPL ELPH-MCNC: 0.74 G/DL (ref 0.68–1.23)
GAMMA GLOB SERPL ELPH-MCNC: 0.76 G/DL (ref 0.62–1.7)
PROT SERPL-MCNC: 6.3 G/DL (ref 5.7–8.2)

## 2025-03-27 NOTE — TELEPHONE ENCOUNTER
Incoming ccall- wife (LIDA signed to release)  Related test result message.  Not sure why urine tests were missed, agreed to return to lab.  Voiced understanding.

## 2025-03-27 NOTE — TELEPHONE ENCOUNTER
Kidney function is worse compared to 7 months ago.  He did not do the urine studies as ordered.  Is there a reason why?.  Have him do ASAP and then see for follow-up.

## 2025-03-28 LAB
ANA NUCLEOLAR TITR SER IF: 1280 {TITER}
CENTROMERE IGG SER-ACNC: <0.4 U/ML
DSDNA AB TITR SER: <10 {TITER}
ENA JO1 AB SER IA-ACNC: <0.3 U/ML
ENA RNP IGG SER IA-ACNC: 0.8 U/ML
ENA SCL70 IGG SER IA-ACNC: <0.6 U/ML
ENA SM IGG SER IA-ACNC: <0.7 U/ML
ENA SS-A IGG SER IA-ACNC: <0.4 U/ML
ENA SS-B IGG SER IA-ACNC: <0.4 U/ML
NUCLEAR IGG TITR SER IF: POSITIVE {TITER}
U1 SNRNP IGG SER IA-ACNC: 0.9 U/ML

## 2025-03-31 ENCOUNTER — TELEPHONE (OUTPATIENT)
Dept: NEPHROLOGY | Facility: CLINIC | Age: 85
End: 2025-03-31

## 2025-03-31 NOTE — TELEPHONE ENCOUNTER
The kidney ultrasound does confirm there is a mass in the left kidney.  This needs further workup and evaluation and refer to urology for this.  Laboratory studies are in except he still has not done the urine studies.  Do the urine studies as ordered and then see me for follow-up to review.

## 2025-04-01 ENCOUNTER — LAB ENCOUNTER (OUTPATIENT)
Dept: LAB | Facility: HOSPITAL | Age: 85
End: 2025-04-01
Attending: INTERNAL MEDICINE
Payer: MEDICARE

## 2025-04-01 DIAGNOSIS — N28.89 RENAL MASS, LEFT: ICD-10-CM

## 2025-04-01 DIAGNOSIS — N18.31 STAGE 3A CHRONIC KIDNEY DISEASE (HCC): ICD-10-CM

## 2025-04-01 LAB
BILIRUB UR QL: NEGATIVE
CLARITY UR: CLEAR
CREAT UR-SCNC: 138.4 MG/DL
GLUCOSE UR-MCNC: NORMAL MG/DL
KETONES UR-MCNC: NEGATIVE MG/DL
LEUKOCYTE ESTERASE UR QL STRIP.AUTO: NEGATIVE
MICROALBUMIN UR-MCNC: 2.3 MG/DL
MICROALBUMIN/CREAT 24H UR-RTO: 16.6 UG/MG (ref ?–30)
NITRITE UR QL STRIP.AUTO: NEGATIVE
PH UR: 5.5 [PH] (ref 5–8)
PROT UR-MCNC: 21.5 MG/DL (ref ?–14)
PROT UR-MCNC: NEGATIVE MG/DL
SP GR UR STRIP: 1.02 (ref 1–1.03)
UROBILINOGEN UR STRIP-ACNC: NORMAL

## 2025-04-01 PROCEDURE — 88108 CYTOPATH CONCENTRATE TECH: CPT

## 2025-04-01 PROCEDURE — 82570 ASSAY OF URINE CREATININE: CPT

## 2025-04-01 PROCEDURE — 84156 ASSAY OF PROTEIN URINE: CPT

## 2025-04-01 PROCEDURE — 86335 IMMUNFIX E-PHORSIS/URINE/CSF: CPT

## 2025-04-01 PROCEDURE — 82043 UR ALBUMIN QUANTITATIVE: CPT

## 2025-04-01 PROCEDURE — 84166 PROTEIN E-PHORESIS/URINE/CSF: CPT

## 2025-04-01 PROCEDURE — 81001 URINALYSIS AUTO W/SCOPE: CPT

## 2025-04-01 NOTE — TELEPHONE ENCOUNTER
Informed patient wife Malissa of note below LIDA  verified and verbalized understanding will urine tomorrow ,provided #[phone] to call urologist ,pt scheduled follow up.

## 2025-04-02 LAB — NON GYNE INTERPRETATION: NEGATIVE

## 2025-04-04 ENCOUNTER — OFFICE VISIT (OUTPATIENT)
Dept: SURGERY | Facility: CLINIC | Age: 85
End: 2025-04-04

## 2025-04-04 VITALS
HEIGHT: 72 IN | SYSTOLIC BLOOD PRESSURE: 122 MMHG | DIASTOLIC BLOOD PRESSURE: 74 MMHG | HEART RATE: 73 BPM | WEIGHT: 177 LBS | BODY MASS INDEX: 23.98 KG/M2

## 2025-04-04 DIAGNOSIS — N28.89 LEFT RENAL MASS: Primary | ICD-10-CM

## 2025-04-04 DIAGNOSIS — R31.21 ASYMPTOMATIC MICROSCOPIC HEMATURIA: ICD-10-CM

## 2025-04-04 PROCEDURE — 99204 OFFICE O/P NEW MOD 45 MIN: CPT | Performed by: UROLOGY

## 2025-04-04 NOTE — PROGRESS NOTES
[facility]rology  Initial Office Consultation    HPI:   Kwabena Spann is a 84 year old male here today for consultation at the request of, and a copy of this note will be sent to, Josef Boyce MD.    1.  Left renal mass  2.  Microscopic hematuria  Patient referred for evaluation of an incidental small left renal mass.    Review of records reveals a CT enterography with IV contrast 6/4/2024 which revealed a 1.4 cm solid exophytic left lower pole renal lesion.      Upon review of previous imaging, this mass was present on CT abdomen from March 12, 2024 and measured similarly at about 1.4 cm.      Most recently, he had an ultrasound of his kidneys on 3/26/2025 which revealed a stable 13 x 11 mm solid exophytic lesion arising in the lower pole of the left kidney.  Additional multiple bilateral large benign-appearing renal cortical cysts, larger on the right.    Patient reports past urological history significant for IR drainage and ablation of a right renal cyst at [facility].    He has also previously underwent a bipolar TURP for BPH by Dr. Amaya April 2019 at [facility].    Patient had a urinalysis 4/1/2025 that showed 3-5 RBCs per hpf.  No signs of UTI.    He denies any significant LUTS.  Denies gross hematuria or dysuria.  He denies flank pain.    No family history of prostate cancer.  Screening PSA level February 2024 normal at 2.02 ng/mL.    He has CKD with a BUN and creatinine of 22/1.42 and a eGFR of 49 from 3/26/2025.      PAST MEDICAL HISTORY: AAA.  Hypertension.  Hyperlipidemia.  DVT.  BPH.  CKD.    PAST SURGICAL HISTORY: EVAR.  Hernia surgery.  Bipolar TURP 4/2019.    History reviewed. No pertinent family history.    Social History     Socioeconomic History    Marital status:    Tobacco Use    Smoking status: Former     Types: Cigarettes     Start date: 4/20/1964    Smokeless tobacco: Never    Tobacco comments:     4-6    Vaping Use    Vaping status: Never  Used   Substance and Sexual Activity    Alcohol use: Yes     Comment: 1 shot a day    Drug use: Never   Other Topics Concern    Weight Concern No     Allergies: Patient has no known allergies.      REVIEW OF SYSTEMS:  Pertinent positives and negatives per HPI. A 12-point ROS was performed and is otherwise negative.       EXAM:  /74 (BP Location: Left arm, Patient Position: Sitting, Cuff Size: adult)   Pulse 73   Ht 6' (1.829 m)   Wt 177 lb (80.3 kg)   BMI 24.01 kg/m²     Physical Exam  Constitutional:       Appearance: He is well-developed.   HENT:      Head: Normocephalic.   Eyes:      General: No scleral icterus.  Cardiovascular:      Rate and Rhythm: Normal rate.   Pulmonary:      Effort: Pulmonary effort is normal.   Skin:     General: Skin is warm and dry.   Neurological:      Mental Status: He is alert and oriented to person, place, and time.   Psychiatric:         Mood and Affect: Mood normal.         Behavior: Behavior normal.       LABS:  See HPI for details.      IMAGING:    US KIDNEY/BLADDER (3/31/2025) which I personally reviewed.  I agree with final radiology report revealing:    CONCLUSION: 1. Solid exophytic lesion arising in the lower pole left kidney measuring 13 x 11 mm corresponds to solid enhancing renal cortical mass on prior imaging.  Suspect renal cell carcinoma. 2. Multiple large benign-appearing right renal cortical cyst.  No hydroureteronephrosis. 3. Normal bladder sonogram.  Bilateral Doppler flow visualized at the UVJs.      CT abdomen with and without IV contrast from 3/12/2024.  I personally reviewed those images as well as discussed them with Dr. Reagan Peterson from radiology.  He agrees that the 1.4 cm enhancing left lower pole renal mass was present at the time.          IMPRESSION:  84 year old male with an incidental small left renal mass.  Asymptomatic microscopic hematuria.    Reviewed with patient at length.  Discussed relevant anatomy and physiology.    I discussed  with the patient at length the finding of an enhancing renal mass on the imaging studies provided. We discussed the differential diagnosis of enhancing renal masses and that the vast majority of these are renal cell carcinomas; however, a minority may be benign acting solid tumors such as oncocytomas or angiomyolipomas.  They understand that in the majority of cases, it is not possible to discern the type of tumor involving their kidney unless a biopsy is performed.  The medical reasoning and rationale for this was discussed in detail. We also discussed that renal cell carcinoma is a heterogeneous disease and that the ultimate prognosis of their disease depends largely on its pathologic features such as histology, nuclear grade and stage.  We reviewed the various clinical and pathological stages and assigned the patient a clinical stage based on their clinical and radiographic evaluation.    We had a lengthy discussion regarding the various treatment options for presumed renal cell carcinoma. These include surgery, immunotherapy, chemotherapy, multimodal therapy and/or observation. Each of these strategies and the medical rationale as well as the risks and benefits were discussed in detail.      Regarding observation, the patient was made aware that there are data regarding observation of small incidentally detected renal masses.  In this regard, we discussed the natural history of observed renal masses.  These data suggest that small, asymptomatic, incidentally detected renal masses may have a slow growing course.  The limitations of these data and their uncertainty were stressed and the patient understands that observation is a calculated risk and that even small, closely followed renal masses may metastasize with significant consequences to their longevity and quality of life.     Regarding chemotherapy, the patient was made aware of the data that most renal cancers are relatively chemoresistant and that systemic  therapy is often reserved for patients with locally advanced or metastatic disease.     The surgical options for the treatment of localized renal masses were stressed. These include radical nephrectomy, partial nephrectomy and ablative techniques.  We discussed indications, rationale, approach, benefits, risks, and alternatives of these options.    Given small size of his left renal mass, his age and comorbidities, and the fact that it has been stable over the past year on comparison imaging I recommend that he consider surveillance of the small renal mass.    He has a CT at [facility] from 2021 which I recommend that he try and obtain for comparison reasons.    Patient agreeable.    We then discussed the finding of microscopic hematuria on his recent urinalysis.  Differential diagnoses reviewed.  Voided urine cytology by nephrology was benign.  I recommended obtaining an MRI urogram (given his CKD) as well as performing cystoscopy for further evaluation.    Benefits, risks, and alternatives reviewed.  Patient agreeable.    All questions answered.      PLAN:  1.  Surveillance of small left renal mass    2.  MR urogram for further evaluation and characterization of left renal mass as well as evaluation of microscopic hematuria.    3.  Schedule cystoscopy for further evaluation of microscopic hematuria.    4.  Try to obtain outside urology records from Dr. Amaya's office.    5.  Patient to try to obtain CT abdomen performed at [facility] October 2021 for comparison reasons.      Isaias Pike MD  4/4/2025

## 2025-04-07 ENCOUNTER — TELEPHONE (OUTPATIENT)
Dept: SURGERY | Facility: CLINIC | Age: 85
End: 2025-04-07

## 2025-04-07 NOTE — TELEPHONE ENCOUNTER
Please inform patient that I reviewed outside CT abdomen and pelvis with IV contrast from [facility] from 10/2021.     It actually shows that the mass was present in the left kidney back then and was also of similar size.  Given these findings and the fact that it has not really significantly increased in size over the past 3 years, I believe that surveillance and monitoring is a good option.        This given to Caron to be uploaded to PACS.    Isaias Pike MD  4/7/2025

## 2025-04-08 ENCOUNTER — TELEPHONE (OUTPATIENT)
Dept: SURGERY | Facility: CLINIC | Age: 85
End: 2025-04-08

## 2025-04-08 ENCOUNTER — OFFICE VISIT (OUTPATIENT)
Dept: NEPHROLOGY | Facility: CLINIC | Age: 85
End: 2025-04-08

## 2025-04-08 VITALS
HEART RATE: 53 BPM | BODY MASS INDEX: 24.24 KG/M2 | DIASTOLIC BLOOD PRESSURE: 58 MMHG | HEIGHT: 72 IN | SYSTOLIC BLOOD PRESSURE: 118 MMHG | WEIGHT: 179 LBS

## 2025-04-08 DIAGNOSIS — N18.31 STAGE 3A CHRONIC KIDNEY DISEASE (HCC): Primary | ICD-10-CM

## 2025-04-08 DIAGNOSIS — N28.89 RENAL MASS, LEFT: ICD-10-CM

## 2025-04-08 PROCEDURE — 99213 OFFICE O/P EST LOW 20 MIN: CPT | Performed by: INTERNAL MEDICINE

## 2025-04-08 NOTE — PROGRESS NOTES
04/08/25        Patient: Kwabena Spann   YOB: 1940   Date of Visit: 4/8/2025       Dear  Dr. Austen MD,      Thank you for referring Kwabena Spann to my practice.  Please find my assessment and plan below.      As you know he is an 84-year-old male with a history of hypertension, status post endovascular repair of an abdominal aortic aneurysm, history of a right hemicolectomy, peripheral vascular disease, intermittent diarrhea who I now had the pleasure of seeing for follow-up of chronic kidney disease stage III and a left renal mass.  Patient just underwent a recent renal evaluation.  Of note is he did have a positive MARSHA at 1-1280 but follow-up serological test, sed rate and C-reactive were all normal making this a false positive MARSHA.  A repeat creatinine done on March 26, 2025 was stable at 1.42 with an estimated GFR of 49 cc/min.  Electrolytes were good.  Her urinalysis did show microscopic hematuria but otherwise was unremarkable.    Finally patient did have a renal ultrasound done on March 26, 2025 which did confirm the presence of a solid exophytic lesion in the lower pole of the left kidney measuring 13 x 11 mm in size.  Radiology suspects a renal cell carcinoma.    As a result I referred the patient to see urology.  He did see Dr. Hernandez who recommended an MRI which he has not yet scheduled.  Encouraged him to do so.  They were however able to obtain an old CT scan from October 2021.  Fortunately this lesion was present back then and has not changed in size or character.  Therefore he will probably most likely recommend surveillance.    Otherwise I advised patient do CBC and renal panel quarterly.  I will see him again in 6 months for follow-up or sooner if clinically indicated.    Thank you again for allowing me to participate in the care of your patient.  If you have any questions please feel free to call.      Sincerely,   Jey Alberts MD   [facility]  [facility], [facility]  [address]  [place] [address]    Document electronically generated by:  Jey Alberts MD

## 2025-04-23 ENCOUNTER — PROCEDURE (OUTPATIENT)
Dept: SURGERY | Facility: CLINIC | Age: 85
End: 2025-04-23

## 2025-04-23 VITALS — HEART RATE: 68 BPM | DIASTOLIC BLOOD PRESSURE: 74 MMHG | RESPIRATION RATE: 16 BRPM | SYSTOLIC BLOOD PRESSURE: 136 MMHG

## 2025-04-23 DIAGNOSIS — R31.21 ASYMPTOMATIC MICROSCOPIC HEMATURIA: Primary | ICD-10-CM

## 2025-04-23 DIAGNOSIS — N28.89 LEFT RENAL MASS: ICD-10-CM

## 2025-04-23 PROCEDURE — 99213 OFFICE O/P EST LOW 20 MIN: CPT | Performed by: UROLOGY

## 2025-04-23 PROCEDURE — 52000 CYSTOURETHROSCOPY: CPT | Performed by: UROLOGY

## 2025-04-23 RX ORDER — CIPROFLOXACIN 500 MG/1
500 TABLET, FILM COATED ORAL ONCE
Status: COMPLETED | OUTPATIENT
Start: 2025-04-23 | End: 2025-04-23

## 2025-04-23 RX ADMIN — CIPROFLOXACIN 500 MG: 500 TABLET, FILM COATED ORAL at 12:55:00

## 2025-04-23 NOTE — PROGRESS NOTES
[facility]  Follow-Up Visit    HPI: Kwabena Spann is a 84 year old male presents for a follow up visit. Patient was last seen on 4/4/2025.    INTERVAL HISTORY: Following up regarding microhematuria and left renal mass.    Voided urine cytology 4/1/2025 was negative.  MR urogram ordered however patient did not complete.    Here for cystoscopy.  He denies gross hematuria or dysuria.    Small 1.4 cm left renal mass, present since 2021 on imaging at [facility].  Largely stable.      1.  Left renal mass  2.  Microscopic hematuria  Patient referred for evaluation of an incidental small left renal mass.     Review of records reveals a CT enterography with IV contrast 6/4/2024 which revealed a 1.4 cm solid exophytic left lower pole renal lesion.       Upon review of previous imaging, this mass was present on CT abdomen from March 12, 2024 and measured similarly at about 1.4 cm.       Most recently, he had an ultrasound of his kidneys on 3/26/2025 which revealed a stable 13 x 11 mm solid exophytic lesion arising in the lower pole of the left kidney.  Additional multiple bilateral large benign-appearing renal cortical cysts, larger on the right.     Patient reports past urological history significant for IR drainage and ablation of a right renal cyst at [facility].     He has also previously underwent a bipolar TURP for BPH by Dr. Amaya April 2019 at [facility].     Patient had a urinalysis 4/1/2025 that showed 3-5 RBCs per hpf.  No signs of UTI.     He denies any significant LUTS.  Denies gross hematuria or dysuria.  He denies flank pain.     No family history of prostate cancer.  Screening PSA level February 2024 normal at 2.02 ng/mL.     He has CKD with a BUN and creatinine of 22/1.42 and a eGFR of 49 from 3/26/2025.    - 4/2025 follow-up: Review of CT abdomen pelvis with IV contrast from [place] from 10/2021 shows a stable 1.2 cm left lower pole renal mass.  Plan to  continue surveillance and schedule MR urogram in 3 months.  Cystoscopy revealing TURP defect and small bladder diverticulum.  Trabeculated bladder.  Otherwise no abnormalities.        PAST MEDICAL HISTORY: AAA.  Hypertension.  Hyperlipidemia.  DVT.  BPH.  CKD.     PAST SURGICAL HISTORY: EVAR.  Hernia surgery.  Bipolar TURP 4/2019.    Reviewed past medical, surgical, family, and social history.  Reviewed med list and allergies.      REVIEW OF SYSTEMS:  Pertinent positives and negatives per HPI. A 10-point ROS was performed and is otherwise negative.       EXAM:  BP (!) 164/62 (BP Location: Left arm, Patient Position: Sitting, Cuff Size: thigh)   Pulse 63   Resp 16     Physical Exam  Constitutional:       Appearance: He is well-developed.   HENT:      Head: Normocephalic.   Eyes:      General: No scleral icterus.  Cardiovascular:      Rate and Rhythm: Normal rate.   Pulmonary:      Effort: Pulmonary effort is normal.   Skin:     General: Skin is warm and dry.   Neurological:      Mental Status: He is alert and oriented to person, place, and time.   Psychiatric:         Mood and Affect: Mood normal.         Behavior: Behavior normal.       PATHOLOGY:  No results found.      LABS:  See HPI for details.      IMAGING:    US KIDNEY/BLADDER (3/31/2025):      CONCLUSION: 1. Solid exophytic lesion arising in the lower pole left kidney measuring 13 x 11 mm corresponds to solid enhancing renal cortical mass on prior imaging.  Suspect renal cell carcinoma. 2. Multiple large benign-appearing right renal cortical cyst.  No hydroureteronephrosis. 3. Normal bladder sonogram.  Bilateral Doppler flow visualized at the UVJs.        CT abdomen with and without IV contrast from 3/12/2024.  I personally reviewed those images as well as discussed them with Dr. Reagna Peterson from radiology.  He agrees that the 1.4 cm enhancing left lower pole renal mass was present at the time.           UROLOGY PROCEDURE:    CYSTOURETHROSCOPY  Informed  consent was obtained for the procedure. The site was prepped and draped in the usual sterile fashion. An Ambu 16 Guinean flexible cystoscope was utilized for the procedure.    Anesthesia:  2% lidocaine gel.    Urethra: Normal.    Prostate / Pelvic: Previous TURP defect noted.  Mild persistent/regrowth of tissue posteriorly.  Widely open bladder neck.    Bladder: Normal.  No tumor, stone, or glomerulation.  W[place]outh diverticulum right posterior wall.    U.O's: Normal.    Trabeculation: 2+.    POST CYSTOSCOPY MEDICATIONS: Sample one tablet Cipro 500 mg given to patient.    DIAGNOSIS: Previous TURP defect.  Small widemouth bladder diverticulum.  Otherwise no significant findings.       IMPRESSION:  84 year old male  with an incidental small left renal mass.  Asymptomatic microscopic hematuria.     BPH with LUTS status post TURP in 2019 by [facility].    Microhematuria evaluation included cytology which was benign.  Cystoscopy today without any significant findings to account for his microhematuria.    Reviewed with patient.      Reviewed outside images from [facility] in 2021 which shows a similar left renal mass that measured about 1.2 cm.    Previously ordered MR urogram to complete evaluation.  He was instructed to complete that.  This would also act as a surveillance study of his small left renal mass that has been present since 2021 and appears to be stable.    All questions answered.  Patient agreeable.      PLAN:  1.  Patient to schedule MR urogram in 3 months for further evaluation of microhematuria as well as surveillance of small left renal mass.      Isaias Pike MD  4/23/2025

## 2025-05-01 DIAGNOSIS — K52.9 CHRONIC DIARRHEA: ICD-10-CM

## 2025-05-02 RX ORDER — LOPERAMIDE HYDROCHLORIDE 2 MG/1
2 CAPSULE ORAL 4 TIMES DAILY PRN
Qty: 100 CAPSULE | Refills: 0 | Status: SHIPPED | OUTPATIENT
Start: 2025-05-02

## 2025-05-02 NOTE — TELEPHONE ENCOUNTER
Requested Prescriptions     Pending Prescriptions Disp Refills    LOPERAMIDE 2 MG Oral Cap [Pharmacy Med Name: LOPERAMIDE 2 MG CAPSULE] 100 capsule 0     Sig: TAKE 1 CAPSULE BY MOUTH 4 TIMES A DAY AS NEEDED FOR DIARRHEA       LOV 2/26/2025  LR  11/26/204    em

## 2025-05-25 DIAGNOSIS — K52.9 CHRONIC DIARRHEA: ICD-10-CM

## 2025-05-27 RX ORDER — LOPERAMIDE HYDROCHLORIDE 2 MG/1
2 CAPSULE ORAL
Qty: 100 CAPSULE | Refills: 0 | Status: SHIPPED | OUTPATIENT
Start: 2025-05-27

## 2025-05-27 NOTE — TELEPHONE ENCOUNTER
Requested Prescriptions     Pending Prescriptions Disp Refills    LOPERAMIDE 2 MG Oral Cap [Pharmacy Med Name: LOPERAMIDE 2 MG CAPSULE] 100 capsule 0     Sig: TAKE 1 CAPSULE BY MOUTH FOUR TIMES A DAY AS NEEDED FOR DIARRHEA       LOV: 2/26/2025          LR: 5/2/2025

## (undated) DEVICE — SUT PDS II 3-0 27IN SH ABSRB VLT L26MM 1/2

## (undated) DEVICE — 60 ML SYRINGE REGULAR TIP: Brand: MONOJECT

## (undated) DEVICE — A P RESECTION: Brand: MEDLINE INDUSTRIES, INC.

## (undated) DEVICE — DISPOSABLE GRASPER CARTRIDGE: Brand: DIRECT DRIVE REPOSABLE GRASPERS

## (undated) DEVICE — YANKAUER,BULB TIP,W/O VENT,RIGID,STERILE: Brand: MEDLINE

## (undated) DEVICE — ANTIBACTERIAL UNDYED BRAIDED (POLYGLACTIN 910), SYNTHETIC ABSORBABLE SUTURE: Brand: COATED VICRYL

## (undated) DEVICE — DISPOSABLE SUCTION/IRRIGATOR TUBE SET: Brand: AHTO

## (undated) DEVICE — ARTICULATING RELOAD WITH TRI-STAPLE TECHNOLOGY: Brand: ENDO GIA

## (undated) DEVICE — Device: Brand: SUTURE PASSOR PRO

## (undated) DEVICE — 3M™ IOBAN™ 2 ANTIMICROBIAL INCISE DRAPE 6650EZ: Brand: IOBAN™ 2

## (undated) DEVICE — TRAP POLYP W/ 2 SPEC TY CLR MAGNIFYING WIND

## (undated) DEVICE — TROCARS: Brand: KII® BLUNT TIP ACCESS SYSTEM

## (undated) DEVICE — CLIP APPLIER WITH CLIP LOGIC TECHNOLOGY: Brand: ENDO CLIP III

## (undated) DEVICE — TROCAR: Brand: KII FIOS FIRST ENTRY

## (undated) DEVICE — GIJAW SINGLE-USE BIOPSY FORCEPS WITH NEEDLE: Brand: GIJAW

## (undated) DEVICE — LAPAROSCOPIC ACCESS SYSTEM: Brand: ALEXIS LAPAROSCOPIC SYSTEM

## (undated) DEVICE — KIT,ANTI FOG,W/SPONGE & FLUID,SOFT PACK: Brand: MEDLINE

## (undated) DEVICE — GAMMEX® NON-LATEX PI ORTHO SIZE 7.5, STERILE POLYISOPRENE POWDER-FREE SURGICAL GLOVE: Brand: GAMMEX

## (undated) DEVICE — SOLUTION IRRIG 1000ML 0.9% NACL USP BTL

## (undated) DEVICE — MEDI-VAC NON-CONDUCTIVE SUCTION TUBING: Brand: CARDINAL HEALTH

## (undated) DEVICE — MEDI-VAC NON-CONDUCTIVE SUCTION TUBING 6MM X 1.8M (6FT.) L: Brand: CARDINAL HEALTH

## (undated) DEVICE — KIT ENDO ORCAPOD 160/180/190

## (undated) DEVICE — DRAPE,ABDOMINAL,MAJOR,STERILE: Brand: MEDLINE

## (undated) DEVICE — [HIGH FLOW INSUFFLATOR,  DO NOT USE IF PACKAGE IS DAMAGED,  KEEP DRY,  KEEP AWAY FROM SUNLIGHT,  PROTECT FROM HEAT AND RADIOACTIVE SOURCES.]: Brand: PNEUMOSURE

## (undated) DEVICE — CLIP INT L ORNG TI TRNSVRS GRV CHEVRON SHP

## (undated) DEVICE — TROCAR: Brand: KII® SLEEVE

## (undated) DEVICE — Device: Brand: DUAL NARE NASAL CANNULAE FEMALE LUER CON 7FT O2 TUBE

## (undated) DEVICE — BLUNT TIP LAPAROSCOPIC SEALER/DIVIDER NANO-COATED: Brand: LIGASURE

## (undated) DEVICE — SUT PERMA- 3-0 18IN SH NABSRB BLK CR 26MM

## (undated) DEVICE — LASSO POLYPECTOMY SNARE: Brand: LASSO

## (undated) DEVICE — UNIVERSAL STAPLER: Brand: ENDO GIA ULTRA

## (undated) DEVICE — KIT CLEAN ENDOKIT 1.1OZ GOWNX2

## (undated) DEVICE — PISTOL GRIP SKIN STAPLER: Brand: MULTIFIRE PREMIUM

## (undated) DEVICE — BLADE 24 SS SRG STRL

## (undated) DEVICE — SUT COAT VCRL+ 0 27IN UR-6 ABSRB VLT ANTIBACT

## (undated) DEVICE — UNDYED BRAIDED (POLYGLACTIN 910), SYNTHETIC ABSORBABLE SUTURE: Brand: COATED VICRYL

## (undated) DEVICE — CONMED SCOPE SAVER BITE BLOCK, 20X27 MM: Brand: SCOPE SAVER

## (undated) DEVICE — SOLUTION PREP 10% POVIDONE IOD 32OZ BTL

## (undated) DEVICE — PAD POS 36IN DISP SURGYPAD

## (undated) DEVICE — SUT PERMA- 2-0 30IN SH NABSRB BLK L26MM 1/

## (undated) NOTE — LETTER
[facility]  Administration of Anesthesia  I, Kwabena Spann agree to be cared for by a physician anesthesiologist alone and/or with a nurse anesthetist, who is specially trained to monitor me and give me medicine to put me to sleep or keep me comfortable during my procedure    I understand that my anesthesiologist and/or anesthetist is not an employee or agent of [facility] or [employer]. He or she works for [facility]    As the patient asking for anesthesia services, I agree to:  Allow the anesthesiologist (anesthesia doctor) to give me medicine and do additional procedures as necessary. Some examples are: Starting or using an “IV” to give me medicine, fluids or blood during my procedure, and having a breathing tube placed to help me breathe when I’m asleep (intubation). In the event that my heart stops working properly, I understand that my anesthesiologist will make every effort to sustain my life, unless otherwise directed by [facility] Do Not Resuscitate documents.  Tell my anesthesia doctor before my procedure:  If I am pregnant.  The last time that I ate or drank.  iii. All of the medicines I take (including prescriptions, herbal supplements, and pills I can buy without a prescription (including street drugs/illegal medications). Failure to inform my anesthesiologist about these medicines may increase my risk of anesthetic complications.  iv.If I am allergic to anything or have had a reaction to anesthesia before.  I understand how the anesthesia medicine will help me (benefits).  I understand that with any type of anesthesia medicine there are risks:  The most common risks are: nausea, vomiting, sore throat, muscle soreness, damage to my eyes, mouth, or teeth (from breathing tube placement).  Rare risks include: remembering what happened during my procedure, allergic reactions to medications, injury to my airway, heart, lungs, vision, nerves, or  muscles and in extremely rare instances death.  My doctor has explained to me other choices available to me for my care (alternatives).  Pregnant Patients (“epidural”):  I understand that the risks of having an epidural (medicine given into my back to help control pain during labor), include itching, low blood pressure, difficulty urinating, headache or slowing of the baby’s heart. Very rare risks include infection, bleeding, seizure, irregular heart rhythms and nerve injury.  Regional Anesthesia (“spinal”, “epidural”, & “nerve blocks”):  I understand that rare but potential complications include headache, bleeding, infection, seizure, irregular heart rhythms, and nerve injury.    _____________________________________________________________________________  Patient (or Representative) Signature/Relationship to Patient  Date   Time    _____________________________________________________________________________   Name (if used)    Language/Organization   Time    _____________________________________________________________________________  Nurse Anesthetist Signature     Date   Time  _____________________________________________________________________________  Anesthesiologist Signature     Date   Time  I have discussed the procedure and information above with the patient (or patient’s representative) and answered their questions. The patient or their representative has agreed to have anesthesia services.    _____________________________________________________________________________  Witness        Date   Time  I have verified that the signature is that of the patient or patient’s representative, and that it was signed before the procedure  Patient Name: Kwabena Spann     : 1940                 Printed: 2024 at 4:43 PM    Medical Record #: [id number]                                            Page 1 of 1  ----------ANESTHESIA CONSENT----------

## (undated) NOTE — LETTER
[facility]  [address], [place], [place]  Authorization for Surgical Operation and Procedure                                                                                           I hereby authorize Wali Chung MD, my physician and his/her assistants (if applicable), which may include medical students, residents, and/or fellows, to perform the following surgical operation/ procedure and administer such anesthesia as may be determined necessary by my physician: Operation/Procedure name (s) COLONOSCOPY/ ESOPHAGOGASTRODUODENOSCOPY on Kwabena Spann   2.   I recognize that during the surgical operation/procedure, unforeseen conditions may necessitate additional or different procedures than those listed above.  I, therefore, further authorize and request that the above-named surgeon, assistants, or designees perform such procedures as are, in their judgment, necessary and desirable.    3.   My surgeon/physician has discussed prior to my surgery the potential benefits, risks and side effects of this procedure; the likelihood of achieving goals; and potential problems that might occur during recuperation.  They also discussed reasonable alternatives to the procedure, including risks, benefits, and side effects related to the alternatives and risks related to not receiving this procedure.  I have had all my questions answered and I acknowledge that no guarantee has been made as to the result that may be obtained.    4.   Should the need arise during my operation/procedure, which includes change of level of care prior to discharge, I also consent to the administration of blood and/or blood products.  Further, I understand that despite careful testing and screening of blood or blood products by collecting agencies, I may still be subject to ill effects as a result of receiving a blood transfusion and/or blood products.  The following are some, but not all, of the potential risks that  can occur: fever and allergic reactions, hemolytic reactions, transmission of diseases such as Hepatitis, AIDS and Cytomegalovirus (CMV) and fluid overload.  In the event that I wish to have an autologous transfusion of my own blood, or a directed donor transfusion, I will discuss this with my physician.  Check only if Refusing Blood or Blood Products  I understand refusal of blood or blood products as deemed necessary by my physician may have serious consequences to my condition to include possible death. I hereby assume responsibility for my refusal and release the hospital, its personnel, and my physicians from any responsibility for the consequences of my refusal.    o  Refuse   5.   I authorize the use of any specimen, organs, tissues, body parts or foreign objects that may be removed from my body during the operation/procedure for diagnosis, research or teaching purposes and their subsequent disposal by hospital authorities.  I also authorize the release of specimen test results and/or written reports to my treating physician on the hospital medical staff or other referring or consulting physicians involved in my care, at the discretion of the Pathologist or my treating physician.    6.   I consent to the photographing or videotaping of the operations or procedures to be performed, including appropriate portions of my body for medical, scientific, or educational purposes, provided my identity is not revealed by the pictures or by descriptive texts accompanying them.  If the procedure has been photographed/videotaped, the surgeon will obtain the original picture, image, videotape or CD.  The hospital will not be responsible for storage, release or maintenance of the picture, image, tape or CD.    7.   I consent to the presence of a  or observers in the operating room as deemed necessary by my physician or their designees.    8.   I recognize that in the event my procedure results in extended  X-Ray/fluoroscopy time, I may develop a skin reaction.    9. If I have a Do Not Attempt Resuscitation (DNAR) order in place, that status will be suspended while in the operating room, procedural suite, and during the recovery period unless otherwise explicitly stated by me (or a person authorized to consent on my behalf). The surgeon or my attending physician will determine when the applicable recovery period ends for purposes of reinstating the DNAR order.  10. Patients having a sterilization procedure: I understand that if the procedure is successful the results will be permanent and it will therefore be impossible for me to inseminate, conceive, or bear children.  I also understand that the procedure is intended to result in sterility, although the result has not been guaranteed.   11. I acknowledge that my physician has explained sedation/analgesia administration to me including the risk and benefits I consent to the administration of sedation/analgesia as may be necessary or desirable in the judgment of my physician.    I CERTIFY THAT I HAVE READ AND FULLY UNDERSTAND THE ABOVE CONSENT TO OPERATION and/or OTHER PROCEDURE.     _________________________________________ _________________________________     ___________________________________  Signature of Patient     Signature of Responsible Person                   Printed Name of Responsible Person                              _________________________________________ ______________________________        ___________________________________  Signature of Witness         Date  Time         Relationship to Patient    STATEMENT OF PHYSICIAN My signature below affirms that prior to the time of the procedure; I have explained to the patient and/or his/her legal representative, the risks and benefits involved in the proposed treatment and any reasonable alternative to the proposed treatment. I have also explained the risks and benefits involved in refusal of the  proposed treatment and alternatives to the proposed treatment and have answered the patient's questions. If I have a significant financial interest in a co-management agreement or a significant financial interest in any product or implant, or other significant relationship used in this procedure/surgery, I have disclosed this and had a discussion with my patient.     _______________________________________________________________ _____________________________  (Signature of Physician)                                                                                         (Date)                                   (Time)  Patient Name: Kwabena Spann    : 1940   Printed: 2024      Medical Record #: [id number]                                              Page 1 of 1

## (undated) NOTE — LETTER
No referring provider defined for this encounter.       04/08/25        Patient: Kwabena Spann   YOB: 1940   Date of Visit: 4/8/2025       Dear  Dr. Austen MD,      Thank you for referring Kwabena Spann to my practice.  Please find my assessment and plan below.      As you know he is an 84-year-old male with a history of hypertension, status post endovascular repair of an abdominal aortic aneurysm, history of a right hemicolectomy, peripheral vascular disease, intermittent diarrhea who I now had the pleasure of seeing for follow-up of chronic kidney disease stage III and a left renal mass.  Patient just underwent a recent renal evaluation.  Of note is he did have a positive MARSHA at 1-1280 but follow-up serological test, sed rate and C-reactive were all normal making this a false positive MARSHA.  A repeat creatinine done on March 26, 2025 was stable at 1.42 with an estimated GFR of 49 cc/min.  Electrolytes were good.  Her urinalysis did show microscopic hematuria but otherwise was unremarkable.    Finally patient did have a renal ultrasound done on March 26, 2025 which did confirm the presence of a solid exophytic lesion in the lower pole of the left kidney measuring 13 x 11 mm in size.  Radiology suspects a renal cell carcinoma.    As a result I referred the patient to see urology.  He did see Dr. Hernandez who recommended an MRI which he has not yet scheduled.  Encouraged him to do so.  They were however able to obtain an old CT scan from October 2021.  Fortunately this lesion was present back then and has not changed in size or character.  Therefore he will probably most likely recommend surveillance.    Otherwise I advised patient do CBC and renal panel quarterly.  I will see him again in 6 months for follow-up or sooner if clinically indicated.    Thank you again for allowing me to participate in the care of your patient.  If you have any questions please feel free to  call.      Sincerely,   Jey Alberts MD   [facility], [facility], [place]  [address]  [facility] [id number]    Document electronically generated by:  Jey Alberts MD

## (undated) NOTE — LETTER
Josef Boyce Md  [address]  [address]  [place] [address]       03/11/25        Patient: Kwabena Spann   YOB: 1940   Date of Visit: 3/11/2025       Dear  Dr. Austen MD,      Thank you for referring Kwabena Spann to my practice.  Please find my assessment and plan below.      As you know he is an 84-year-old male with a history of hypertension, status post endovascular repair of an abdominal aortic aneurysm, history of right hemicolectomy, peripheral vascular disease, intermittent diarrhea, who I now had the pleasure of seeing for evaluation of chronic kidney disease stage III and a possible left renal mass.    The patient had a CT scan of the abdomen on March 12, 2024 with IV contrast.  This revealed numerous bilateral renal cysts.  No enhancing renal mass was identified.  However he then had a CT enterography done in June or 2024.  This mentioned a 1.4 cm exophytic mass in the left kidney.  Finally a repeat CT enterography was done on January 2, 2025.  This again mention a 1.2 cm solid-appearing lesion in the left kidney which was grossly unchanged.    The patient really had a normal creatinine back in July 2020 for a 1.14.  However crept up to 1.27 in August 2024 and on January 2, 2025 his creatinine was 1.40 with an estimated GFR of 50 cc/min.    His past medical history is notable for endovascular repair of abdominal aortic aneurysm.  He also has had peripheral vascular disease.  He is not the best historian states he has only had hypertension for several years.  He had a right hemicolectomy.  There is a history of hypercholesterolemia but is not aware of any organic heart disease.  He mentions a some kind of drainage procedure on his right kidney 5 years ago.  He has chronic but very intermittent diarrhea followed by GI.    Medications are as listed.  Denies any significant use of nonsteroidals.  Social history former cigarette smoker.  Family history negative for  renal pathology.  Review of system the patient otherwise states he is doing well without any chest pain, shortness of breath, GI or urinary tract symptoms.  10 point review of systems is otherwise unremarkable.    Physical exam his blood pressure 116/68 with a pulse of 65 and weight 177 pounds.  His neck was supple without JVD.  Lungs were clear.  Heart revealed a regular rate and rhythm and S4 but no gallops, murmurs or rubs.  Abdomen was soft, flat, nontender without organomegaly, masses or bruits.  Extremities revealed no clubbing, cyanosis or edema.    I therefore informed the patient there is a questionable  left renal mass.  Also may be developing chronic kidney disease stage III.  This may be secondary to hypertension.  Other causes need to be excluded.    For completion sake a repeat CBC, renal panel, urinalysis, urine for microalbumin, urine for Bence-Scott protein, sed rate, connective tissue profile, and a renal ultrasound have been ordered.  Further impressions and recommendations will be forthcoming after reviewing the above.  If a renal mass is clearly identified would need to see urology for further workup and evaluation.  Otherwise maintain adequate hydration.  Avoid nonsteroidals.    Thank you very much for allowing me to participate in the care of your patient.  If you have any questions please feel free to call.           Sincerely,   Jey Alberts MD   [facility], [facility], [place]  [address]  [facility] [id number]    Document electronically generated by:  Jey Alberts MD